# Patient Record
Sex: FEMALE | Race: BLACK OR AFRICAN AMERICAN | Employment: UNEMPLOYED | URBAN - METROPOLITAN AREA
[De-identification: names, ages, dates, MRNs, and addresses within clinical notes are randomized per-mention and may not be internally consistent; named-entity substitution may affect disease eponyms.]

---

## 2022-07-19 ENCOUNTER — HOSPITAL ENCOUNTER (EMERGENCY)
Age: 72
Discharge: HOME | End: 2022-07-19
Attending: STUDENT IN AN ORGANIZED HEALTH CARE EDUCATION/TRAINING PROGRAM
Payer: MEDICARE

## 2022-07-19 ENCOUNTER — APPOINTMENT (OUTPATIENT)
Dept: CT IMAGING | Age: 72
End: 2022-07-19
Attending: STUDENT IN AN ORGANIZED HEALTH CARE EDUCATION/TRAINING PROGRAM
Payer: MEDICARE

## 2022-07-19 VITALS
DIASTOLIC BLOOD PRESSURE: 93 MMHG | HEIGHT: 61 IN | SYSTOLIC BLOOD PRESSURE: 153 MMHG | TEMPERATURE: 98.5 F | HEART RATE: 70 BPM | RESPIRATION RATE: 18 BRPM | OXYGEN SATURATION: 97 %

## 2022-07-19 DIAGNOSIS — K52.9 COLITIS: Primary | ICD-10-CM

## 2022-07-19 LAB
ALBUMIN SERPL-MCNC: 4.4 G/DL (ref 3.5–5.2)
ALBUMIN/GLOB SERPL: 1.3 {RATIO} (ref 1.1–2.2)
ALP SERPL-CCNC: 82 U/L (ref 35–104)
ALT SERPL-CCNC: 15 U/L (ref 10–35)
ANION GAP SERPL CALC-SCNC: 12 MMOL/L (ref 5–15)
AST SERPL-CCNC: 32 U/L (ref 10–35)
BASOPHILS # BLD: 0 K/UL (ref 0–0.1)
BASOPHILS NFR BLD: 0 % (ref 0–1)
BILIRUB SERPL-MCNC: 0.6 MG/DL (ref 0.2–1)
BUN SERPL-MCNC: 16 MG/DL (ref 8–23)
BUN/CREAT SERPL: 16 (ref 12–20)
CALCIUM SERPL-MCNC: 9.9 MG/DL (ref 8.8–10.2)
CHLORIDE SERPL-SCNC: 103 MMOL/L (ref 98–107)
CO2 SERPL-SCNC: 27 MMOL/L (ref 22–29)
COMMENT, HOLDF: NORMAL
CREAT SERPL-MCNC: 1.02 MG/DL (ref 0.5–0.9)
DIFFERENTIAL METHOD BLD: ABNORMAL
EOSINOPHIL # BLD: 0 K/UL (ref 0–0.4)
EOSINOPHIL NFR BLD: 0 % (ref 0–7)
ERYTHROCYTE [DISTWIDTH] IN BLOOD BY AUTOMATED COUNT: 11.5 % (ref 11.5–14.5)
GLOBULIN SER CALC-MCNC: 3.3 G/DL (ref 2–4)
GLUCOSE SERPL-MCNC: 143 MG/DL (ref 65–100)
HCT VFR BLD AUTO: 38.1 % (ref 35–47)
HEMOCCULT STL QL: POSITIVE
HGB BLD-MCNC: 12.3 G/DL (ref 11.5–16)
IMM GRANULOCYTES # BLD AUTO: 0.1 K/UL (ref 0–0.04)
IMM GRANULOCYTES NFR BLD AUTO: 1 % (ref 0–0.5)
LYMPHOCYTES # BLD: 0.8 K/UL (ref 0.8–3.5)
LYMPHOCYTES NFR BLD: 4 % (ref 12–49)
MCH RBC QN AUTO: 30.1 PG (ref 26–34)
MCHC RBC AUTO-ENTMCNC: 32.3 G/DL (ref 30–36.5)
MCV RBC AUTO: 93.4 FL (ref 80–99)
MONOCYTES # BLD: 1.3 K/UL (ref 0–1)
MONOCYTES NFR BLD: 6 % (ref 5–13)
NEUTS SEG # BLD: 18.7 K/UL (ref 1.8–8)
NEUTS SEG NFR BLD: 89 % (ref 32–75)
NRBC # BLD: 0 K/UL (ref 0–0.01)
NRBC BLD-RTO: 0 PER 100 WBC
PLATELET # BLD AUTO: 322 K/UL (ref 150–400)
PMV BLD AUTO: 11.1 FL (ref 8.9–12.9)
POTASSIUM SERPL-SCNC: 4.1 MMOL/L (ref 3.5–5.1)
PROT SERPL-MCNC: 7.7 G/DL (ref 6.4–8.3)
RBC # BLD AUTO: 4.08 M/UL (ref 3.8–5.2)
SAMPLES BEING HELD,HOLD: NORMAL
SODIUM SERPL-SCNC: 142 MMOL/L (ref 136–145)
WBC # BLD AUTO: 20.9 K/UL (ref 3.6–11)

## 2022-07-19 PROCEDURE — 80053 COMPREHEN METABOLIC PANEL: CPT

## 2022-07-19 PROCEDURE — 74176 CT ABD & PELVIS W/O CONTRAST: CPT

## 2022-07-19 PROCEDURE — 99284 EMERGENCY DEPT VISIT MOD MDM: CPT

## 2022-07-19 PROCEDURE — 36415 COLL VENOUS BLD VENIPUNCTURE: CPT

## 2022-07-19 PROCEDURE — 85025 COMPLETE CBC W/AUTO DIFF WBC: CPT

## 2022-07-19 PROCEDURE — 82272 OCCULT BLD FECES 1-3 TESTS: CPT

## 2022-07-19 RX ORDER — SERTRALINE HYDROCHLORIDE 25 MG/1
TABLET, FILM COATED ORAL DAILY
COMMUNITY

## 2022-07-19 RX ORDER — PHENOL/SODIUM PHENOLATE
20 AEROSOL, SPRAY (ML) MUCOUS MEMBRANE DAILY
COMMUNITY

## 2022-07-19 RX ORDER — AMOXICILLIN AND CLAVULANATE POTASSIUM 875; 125 MG/1; MG/1
1 TABLET, FILM COATED ORAL 2 TIMES DAILY
Qty: 14 TABLET | Refills: 0 | Status: SHIPPED | OUTPATIENT
Start: 2022-07-19 | End: 2022-07-26

## 2022-07-19 RX ORDER — LANOLIN ALCOHOL/MO/W.PET/CERES
800 CREAM (GRAM) TOPICAL DAILY
COMMUNITY

## 2022-07-19 RX ORDER — ATORVASTATIN CALCIUM 10 MG/1
TABLET, FILM COATED ORAL DAILY
COMMUNITY

## 2022-07-19 RX ORDER — AMLODIPINE BESYLATE 10 MG/1
TABLET ORAL DAILY
COMMUNITY
End: 2022-08-29

## 2022-07-19 RX ORDER — DONEPEZIL HYDROCHLORIDE 10 MG/1
10 TABLET, FILM COATED ORAL
COMMUNITY

## 2022-07-19 RX ORDER — MEMANTINE HYDROCHLORIDE 10 MG/1
10 TABLET ORAL 2 TIMES DAILY
COMMUNITY

## 2022-07-19 RX ORDER — QUETIAPINE FUMARATE 25 MG/1
25 TABLET, FILM COATED ORAL
COMMUNITY

## 2022-07-19 NOTE — DISCHARGE INSTRUCTIONS
Patient presented to the ED with nausea and vomiting yesterday followed by blood in the stool today. Certainly a small mount of blood is present in stool now. CT scan shows colitis which is likely causing the bleeding. Hemoglobin is stable additionally patient's vital signs are stable. CT scan shows some bladder wall thickening with possible urinary tract infection however obtaining urine this patient would be difficult. Patient does not have history of UTIs. We will treat colitis with Augmentin for 7 days. Please follow-up with GI for further treatment and evaluation colonoscopy once symptoms have resolved. If bloody stool continues and patient becomes ill please follow-up more quickly or return to the ED for further evaluation.

## 2022-07-19 NOTE — ED TRIAGE NOTES
Pt ambulatory into ER accompanied by Daughter with cc of vomiting last night  and rectal bleeding noted in stool this morning. Pt has a baseline of dementia and alzheimers and is not oriented at baseline per daughter. Pt lives with daughter. Daughter describes rectal bleeding as bright red. Upon arrival, pt in no apparent distress. Pt singing and talking to self calmly. Pt's daughter denies seeing blood in vomit. Pt's daughter denies vomiting today.

## 2022-07-19 NOTE — ED PROVIDER NOTES
Patient is a 28-year-old female with a history of severe dementia presenting to the ED after having multiple signs of vomiting last night followed by bright red blood per rectum today. Patient has no abdominal pain nor has claimed any abdominal pain to family. Patient is acting at her baseline per family. Limited history available as patient's daughter just obtained custody. No further vomiting today. The history is provided by the patient and a relative. The history is limited by the condition of the patient. Rectal Bleeding   This is a new problem. The current episode started 3 to 5 hours ago. The stool is described as blood tinged. Associated symptoms include nausea, vomiting and diarrhea. Pertinent negatives include no abdominal pain, no abdominal distention, no back pain and no constipation. She has tried nothing for the symptoms. The treatment provided no relief. Her past medical history is significant for dementia. Past Medical History:   Diagnosis Date    Alzheimer's dementia ([facility] [address]     Dementia ([facility] [address]     Hypertension        History reviewed. No pertinent surgical history. History reviewed. No pertinent family history.     Social History     Socioeconomic History    Marital status:      Spouse name: Not on file    Number of children: Not on file    Years of education: Not on file    Highest education level: Not on file   Occupational History    Not on file   Tobacco Use    Smoking status: Never Smoker    Smokeless tobacco: Not on file   Substance and Sexual Activity    Alcohol use: Not Currently    Drug use: Never    Sexual activity: Not on file   Other Topics Concern    Not on file   Social History Narrative    Not on file     Social Determinants of Health     Financial Resource Strain:     Difficulty of Paying Living Expenses: Not on file   Food Insecurity:     Worried About Running Out of Food in the Last Year: Not on file    [address] in the Last Year: Not on file   Transportation Needs:     Lack of Transportation (Medical): Not on file    Lack of Transportation (Non-Medical): Not on file   Physical Activity:     Days of Exercise per Week: Not on file    Minutes of Exercise per Session: Not on file   Stress:     Feeling of Stress : Not on file   Social Connections:     Frequency of Communication with Friends and Family: Not on file    Frequency of Social Gatherings with Friends and Family: Not on file    Attends Yazdanism Services: Not on file    Active Member of [address] [place] [address] or Organizations: Not on file    Attends Club or Organization Meetings: Not on file    Marital Status: Not on file   Intimate Partner Violence:     Fear of Current or Ex-Partner: Not on file    Emotionally Abused: Not on file    Physically Abused: Not on file    Sexually Abused: Not on file   Housing Stability:     Unable to Pay for Housing in the Last Year: Not on file    Number of [place] in the Last Year: Not on file    Unstable Housing in the Last Year: Not on file         ALLERGIES: Patient has no known allergies. Review of Systems   Unable to perform ROS: Dementia   Gastrointestinal: Positive for anal bleeding, blood in stool, diarrhea, nausea and vomiting. Negative for abdominal distention, abdominal pain and constipation. Musculoskeletal: Negative for back pain. Vitals:    07/19/22 1318 07/19/22 1447 07/19/22 1559   BP: (!) 110/95 106/75 (!) 153/93   Pulse: 91 70    Resp: 18 18 18   Temp: 98.5 °F (36.9 °C)     SpO2: 100% 97%    Height: 5' 1\" (1.549 m)              Physical Exam  Vitals and nursing note reviewed. Constitutional:       General: She is not in acute distress. Appearance: Normal appearance. HENT:      Head: Normocephalic and atraumatic. Right Ear: External ear normal.      Left Ear: External ear normal.      Nose: Nose normal.   Eyes:      Extraocular Movements: Extraocular movements intact.       Conjunctiva/sclera: Conjunctivae normal.   Cardiovascular:      Rate and Rhythm: Normal rate. Pulses: Normal pulses. Radial pulses are 2+ on the right side and 2+ on the left side. Heart sounds: Normal heart sounds. Pulmonary:      Effort: Pulmonary effort is normal.      Breath sounds: Normal breath sounds. Chest:      Chest wall: No deformity or tenderness. Abdominal:      General: Abdomen is flat. There is no distension. Tenderness: There is no abdominal tenderness. Genitourinary:     Rectum: Guaiac result positive. Comments: Some small flecks of blood present in stool. No large-volume gross blood output  Musculoskeletal:         General: No deformity or signs of injury. Normal range of motion. Cervical back: Normal range of motion and neck supple. No tenderness. Skin:     General: Skin is warm and dry. Capillary Refill: Capillary refill takes less than 2 seconds. Neurological:      General: No focal deficit present. Mental Status: She is alert. Mental status is at baseline. Psychiatric:         Attention and Perception: Attention normal.      Comments: Patient with repetitive phrases, childlike behavior which is baseline per patient's daughter due to dementia. [facility]  ED Course as of 07/19/22 2219   Tue Jul 19, 2022   1457 HGB: 12.3 [AL]      ED Course User Index  [AL] Peri Alcantar MD     LABORATORY RESULTS:  Labs Reviewed   CBC WITH AUTOMATED DIFF - Abnormal; Notable for the following components:       Result Value    WBC 20.9 (*)     NEUTROPHILS 89 (*)     LYMPHOCYTES 4 (*)     IMMATURE GRANULOCYTES 1 (*)     ABS. NEUTROPHILS 18.7 (*)     ABS. MONOCYTES 1.3 (*)     ABS. IMM.  GRANS. 0.1 (*)     All other components within normal limits   METABOLIC PANEL, COMPREHENSIVE - Abnormal; Notable for the following components:    Glucose 143 (*)     Creatinine 1.02 (*)     GFR est non-AA 54 (*)     All other components within normal limits   OCCULT BLOOD, STOOL - Abnormal; Notable for the following components:    Occult blood, stool Positive (*)     All other components within normal limits   SAMPLES BEING HELD       IMAGING RESULTS:  CT ABD PELV WO CONT   Final Result   1. Probable mild colitis involving the splenic flexure of the colon. 2.  Probable bilateral adrenal adenomas. Evaluation is limited by motion. 3.  Multiple uterine leiomyoma. 4.  Urinary bladder wall thickening which may be part related to   underdistention. Cystitis is not excluded. Correlate with urinalysis          MEDICATIONS GIVEN:  Medications - No data to display    Differential diagnosis: Melena, internal hemorrhoid, GI bleed, GI illness with nausea vomiting and diarrhea    ED physician interpretation of laboratory results: Lab work without signs of symptomatic bleeding. Occult stool positive as well as generally grossly positive stool with flecks of blood. Patient's hemoglobin is stable. Within normal limits. Leukocytosis consistent with acute vomiting illness/colitis. MDM: Patient is a 21-year-old female with severe dementia presenting to the ED with nausea and vomiting last night and blood in stool this morning found to have likely colitis/acute GI illness with stable vital signs. Family not concerned of blood transfusion at this time and patient does not require it. Based on history and physical most likely patient has symptoms of nonspecific colitis. We will treat with Augmentin and instructed family to follow-up with GI. Strict return precautions given. Patient has no history of urinary tract infections. Discussed plan with patient's daughter and she is comfortable with discharge at this time. Key discharge instructions and summary of care: Patient presented to the ED with nausea and vomiting yesterday followed by blood in the stool today. Certainly a small mount of blood is present in stool now. CT scan shows colitis which is likely causing the bleeding.   Hemoglobin is stable additionally patient's vital signs are stable. CT scan shows some bladder wall thickening with possible urinary tract infection however obtaining urine this patient would be difficult. Patient does not have history of UTIs. We will treat colitis with Augmentin for 7 days. Please follow-up with GI for further treatment and evaluation colonoscopy once symptoms have resolved. If bloody stool continues and patient becomes ill please follow-up more quickly or return to the ED for further evaluation. The patient has been re-evaluated and feeling better. Patient is stable for discharge. All available radiology and laboratory results have been reviewed with patient and/or available family. Patient and/or family verbally conveyed their understanding and agreement of the patient's signs, symptoms, diagnosis, treatment and prognosis and additionally agree to follow-up as recommended in the discharge instructions or to return to the Emergency Department should their condition change or worsen prior to their follow-up appointment. All questions have been answered and patient and/or available family express understanding. IMPRESSION:  1. Colitis        DISPOSITION: Discharged     Celso Jimenez MD      Procedures

## 2022-07-19 NOTE — ED NOTES
Pt's daughter given discharge instructions, patient education, 1 prescription, and follow up information. Pt's Daughter verbalizes understanding. All questions answered. Pt discharged to home in private vehicle, ambulatory. Pt alert, RA, pain controlled.

## 2022-08-27 ENCOUNTER — APPOINTMENT (OUTPATIENT)
Dept: CT IMAGING | Age: 72
End: 2022-08-27
Attending: STUDENT IN AN ORGANIZED HEALTH CARE EDUCATION/TRAINING PROGRAM
Payer: MEDICARE

## 2022-08-27 ENCOUNTER — HOSPITAL ENCOUNTER (OUTPATIENT)
Age: 72
Setting detail: OBSERVATION
Discharge: HOME HEALTH | End: 2022-08-29
Attending: STUDENT IN AN ORGANIZED HEALTH CARE EDUCATION/TRAINING PROGRAM | Admitting: HOSPITALIST
Payer: MEDICARE

## 2022-08-27 DIAGNOSIS — R55 SYNCOPE AND COLLAPSE: Primary | ICD-10-CM

## 2022-08-27 DIAGNOSIS — J18.9 PNEUMONIA DUE TO INFECTIOUS ORGANISM, UNSPECIFIED LATERALITY, UNSPECIFIED PART OF LUNG: ICD-10-CM

## 2022-08-27 DIAGNOSIS — D64.9 ANEMIA, UNSPECIFIED TYPE: ICD-10-CM

## 2022-08-27 PROBLEM — R82.81 PYURIA: Status: ACTIVE | Noted: 2022-08-27

## 2022-08-27 PROBLEM — G30.9 ALZHEIMER'S DEMENTIA (HCC): Status: ACTIVE | Noted: 2022-08-27

## 2022-08-27 PROBLEM — E86.0 DEHYDRATION: Status: ACTIVE | Noted: 2022-08-27

## 2022-08-27 PROBLEM — F02.80 ALZHEIMER'S DEMENTIA (HCC): Status: ACTIVE | Noted: 2022-08-27

## 2022-08-27 PROBLEM — I10 HYPERTENSION: Status: ACTIVE | Noted: 2022-08-27

## 2022-08-27 PROBLEM — E78.5 HYPERLIPIDEMIA: Status: ACTIVE | Noted: 2022-08-27

## 2022-08-27 PROBLEM — Z79.899 POLYPHARMACY: Status: ACTIVE | Noted: 2022-08-27

## 2022-08-27 PROBLEM — I95.9 HYPOTENSION: Status: ACTIVE | Noted: 2022-08-27

## 2022-08-27 LAB
ALBUMIN SERPL-MCNC: 3.9 G/DL (ref 3.5–5.2)
ALBUMIN/GLOB SERPL: 1.3 {RATIO} (ref 1.1–2.2)
ALP SERPL-CCNC: 69 U/L (ref 35–104)
ALT SERPL-CCNC: 10 U/L (ref 10–35)
ANION GAP SERPL CALC-SCNC: 9 MMOL/L (ref 5–15)
APPEARANCE UR: ABNORMAL
AST SERPL-CCNC: 17 U/L (ref 10–35)
BACTERIA URNS QL MICRO: ABNORMAL /HPF
BASOPHILS # BLD: 0 K/UL (ref 0–0.1)
BASOPHILS NFR BLD: 0 % (ref 0–1)
BILIRUB SERPL-MCNC: 0.7 MG/DL (ref 0.2–1)
BILIRUB UR QL: NEGATIVE
BUN SERPL-MCNC: 16 MG/DL (ref 8–23)
BUN/CREAT SERPL: 12 (ref 12–20)
CALCIUM SERPL-MCNC: 10.1 MG/DL (ref 8.8–10.2)
CHLORIDE SERPL-SCNC: 103 MMOL/L (ref 98–107)
CO2 SERPL-SCNC: 32 MMOL/L (ref 22–29)
COLOR UR: ABNORMAL
COVID-19 RAPID TEST, COVR: NOT DETECTED
CREAT SERPL-MCNC: 1.34 MG/DL (ref 0.5–0.9)
DIFFERENTIAL METHOD BLD: ABNORMAL
EOSINOPHIL # BLD: 0.1 K/UL (ref 0–0.4)
EOSINOPHIL NFR BLD: 1 % (ref 0–7)
EPITH CASTS URNS QL MICRO: ABNORMAL /LPF
ERYTHROCYTE [DISTWIDTH] IN BLOOD BY AUTOMATED COUNT: 11.9 % (ref 11.5–14.5)
GLOBULIN SER CALC-MCNC: 3 G/DL (ref 2–4)
GLUCOSE SERPL-MCNC: 168 MG/DL (ref 65–100)
GLUCOSE UR STRIP.AUTO-MCNC: NEGATIVE MG/DL
HCT VFR BLD AUTO: 31.6 % (ref 35–47)
HGB BLD-MCNC: 10.2 G/DL (ref 11.5–16)
HGB UR QL STRIP: NEGATIVE
IMM GRANULOCYTES # BLD AUTO: 0 K/UL (ref 0–0.04)
IMM GRANULOCYTES NFR BLD AUTO: 0 % (ref 0–0.5)
KETONES UR QL STRIP.AUTO: NEGATIVE MG/DL
LDH SERPL L TO P-CCNC: 274 U/L (ref 81–246)
LEUKOCYTE ESTERASE UR QL STRIP.AUTO: ABNORMAL
LIPASE SERPL-CCNC: 22 U/L (ref 13–60)
LYMPHOCYTES # BLD: 1.5 K/UL (ref 0.8–3.5)
LYMPHOCYTES NFR BLD: 15 % (ref 12–49)
MCH RBC QN AUTO: 30.5 PG (ref 26–34)
MCHC RBC AUTO-ENTMCNC: 32.3 G/DL (ref 30–36.5)
MCV RBC AUTO: 94.6 FL (ref 80–99)
MONOCYTES # BLD: 0.8 K/UL (ref 0–1)
MONOCYTES NFR BLD: 8 % (ref 5–13)
NEUTS SEG # BLD: 7.9 K/UL (ref 1.8–8)
NEUTS SEG NFR BLD: 76 % (ref 32–75)
NITRITE UR QL STRIP.AUTO: NEGATIVE
NRBC # BLD: 0 K/UL (ref 0–0.01)
NRBC BLD-RTO: 0 PER 100 WBC
PH UR STRIP: 7.5 [PH] (ref 5–8)
PLATELET # BLD AUTO: 400 K/UL (ref 150–400)
PMV BLD AUTO: 10 FL (ref 8.9–12.9)
POTASSIUM SERPL-SCNC: 3.2 MMOL/L (ref 3.5–5.1)
PROCALCITONIN SERPL-MCNC: 0.08 NG/ML
PROT SERPL-MCNC: 6.9 G/DL (ref 6.4–8.3)
PROT UR STRIP-MCNC: NEGATIVE MG/DL
RBC # BLD AUTO: 3.34 M/UL (ref 3.8–5.2)
RBC #/AREA URNS HPF: ABNORMAL /HPF
RETICS # AUTO: 0.08 M/UL (ref 0.02–0.08)
RETICS/RBC NFR AUTO: 2.4 % (ref 0.7–2.1)
SODIUM SERPL-SCNC: 144 MMOL/L (ref 136–145)
SOURCE, COVRS: NORMAL
SP GR UR REFRACTOMETRY: 1.01 (ref 1–1.03)
TROPONIN I BLD-MCNC: <0.04 NG/ML (ref 0–0.08)
TROPONIN I BLD-MCNC: <0.04 NG/ML (ref 0–0.08)
TSH SERPL DL<=0.05 MIU/L-ACNC: 1.76 UIU/ML (ref 0.27–4.2)
UA: UC IF INDICATED,UAUC: ABNORMAL
UROBILINOGEN UR QL STRIP.AUTO: 0.2 EU/DL (ref 0.2–1)
WBC # BLD AUTO: 10.3 K/UL (ref 3.6–11)
WBC URNS QL MICRO: ABNORMAL /HPF (ref 0–4)

## 2022-08-27 PROCEDURE — 70450 CT HEAD/BRAIN W/O DYE: CPT

## 2022-08-27 PROCEDURE — 74011000250 HC RX REV CODE- 250: Performed by: STUDENT IN AN ORGANIZED HEALTH CARE EDUCATION/TRAINING PROGRAM

## 2022-08-27 PROCEDURE — 36415 COLL VENOUS BLD VENIPUNCTURE: CPT

## 2022-08-27 PROCEDURE — 74011250637 HC RX REV CODE- 250/637: Performed by: INTERNAL MEDICINE

## 2022-08-27 PROCEDURE — 72125 CT NECK SPINE W/O DYE: CPT

## 2022-08-27 PROCEDURE — 87635 SARS-COV-2 COVID-19 AMP PRB: CPT

## 2022-08-27 PROCEDURE — 84484 ASSAY OF TROPONIN QUANT: CPT

## 2022-08-27 PROCEDURE — 84145 PROCALCITONIN (PCT): CPT

## 2022-08-27 PROCEDURE — 74011250636 HC RX REV CODE- 250/636: Performed by: STUDENT IN AN ORGANIZED HEALTH CARE EDUCATION/TRAINING PROGRAM

## 2022-08-27 PROCEDURE — 81001 URINALYSIS AUTO W/SCOPE: CPT

## 2022-08-27 PROCEDURE — 74177 CT ABD & PELVIS W/CONTRAST: CPT

## 2022-08-27 PROCEDURE — 80053 COMPREHEN METABOLIC PANEL: CPT

## 2022-08-27 PROCEDURE — 99285 EMERGENCY DEPT VISIT HI MDM: CPT

## 2022-08-27 PROCEDURE — G0378 HOSPITAL OBSERVATION PER HR: HCPCS

## 2022-08-27 PROCEDURE — 74011250636 HC RX REV CODE- 250/636: Performed by: INTERNAL MEDICINE

## 2022-08-27 PROCEDURE — 87086 URINE CULTURE/COLONY COUNT: CPT

## 2022-08-27 PROCEDURE — 85045 AUTOMATED RETICULOCYTE COUNT: CPT

## 2022-08-27 PROCEDURE — 96366 THER/PROPH/DIAG IV INF ADDON: CPT

## 2022-08-27 PROCEDURE — 96365 THER/PROPH/DIAG IV INF INIT: CPT

## 2022-08-27 PROCEDURE — 84443 ASSAY THYROID STIM HORMONE: CPT

## 2022-08-27 PROCEDURE — 74011000636 HC RX REV CODE- 636: Performed by: STUDENT IN AN ORGANIZED HEALTH CARE EDUCATION/TRAINING PROGRAM

## 2022-08-27 PROCEDURE — 93005 ELECTROCARDIOGRAM TRACING: CPT

## 2022-08-27 PROCEDURE — 83690 ASSAY OF LIPASE: CPT

## 2022-08-27 PROCEDURE — 85025 COMPLETE CBC W/AUTO DIFF WBC: CPT

## 2022-08-27 PROCEDURE — 96375 TX/PRO/DX INJ NEW DRUG ADDON: CPT

## 2022-08-27 PROCEDURE — 83615 LACTATE (LD) (LDH) ENZYME: CPT

## 2022-08-27 PROCEDURE — 87077 CULTURE AEROBIC IDENTIFY: CPT

## 2022-08-27 RX ORDER — QUETIAPINE FUMARATE 25 MG/1
25 TABLET, FILM COATED ORAL
Status: DISCONTINUED | OUTPATIENT
Start: 2022-08-27 | End: 2022-08-29 | Stop reason: HOSPADM

## 2022-08-27 RX ORDER — SODIUM CHLORIDE 0.9 % (FLUSH) 0.9 %
5-40 SYRINGE (ML) INJECTION AS NEEDED
Status: DISCONTINUED | OUTPATIENT
Start: 2022-08-27 | End: 2022-08-29 | Stop reason: HOSPADM

## 2022-08-27 RX ORDER — ONDANSETRON 2 MG/ML
4 INJECTION INTRAMUSCULAR; INTRAVENOUS
Status: DISCONTINUED | OUTPATIENT
Start: 2022-08-27 | End: 2022-08-29 | Stop reason: HOSPADM

## 2022-08-27 RX ORDER — MEMANTINE HYDROCHLORIDE 10 MG/1
10 TABLET ORAL 2 TIMES DAILY
Status: DISCONTINUED | OUTPATIENT
Start: 2022-08-27 | End: 2022-08-29 | Stop reason: HOSPADM

## 2022-08-27 RX ORDER — SODIUM CHLORIDE, SODIUM LACTATE, POTASSIUM CHLORIDE, CALCIUM CHLORIDE 600; 310; 30; 20 MG/100ML; MG/100ML; MG/100ML; MG/100ML
100 INJECTION, SOLUTION INTRAVENOUS CONTINUOUS
Status: DISCONTINUED | OUTPATIENT
Start: 2022-08-27 | End: 2022-08-29 | Stop reason: HOSPADM

## 2022-08-27 RX ORDER — PANTOPRAZOLE SODIUM 40 MG/1
40 TABLET, DELAYED RELEASE ORAL
Status: DISCONTINUED | OUTPATIENT
Start: 2022-08-28 | End: 2022-08-29 | Stop reason: HOSPADM

## 2022-08-27 RX ORDER — SODIUM CHLORIDE 0.9 % (FLUSH) 0.9 %
5-40 SYRINGE (ML) INJECTION EVERY 8 HOURS
Status: DISCONTINUED | OUTPATIENT
Start: 2022-08-27 | End: 2022-08-29 | Stop reason: HOSPADM

## 2022-08-27 RX ORDER — DONEPEZIL HYDROCHLORIDE 5 MG/1
10 TABLET, FILM COATED ORAL
Status: DISCONTINUED | OUTPATIENT
Start: 2022-08-27 | End: 2022-08-29 | Stop reason: HOSPADM

## 2022-08-27 RX ORDER — NALOXONE HYDROCHLORIDE 0.4 MG/ML
0.4 INJECTION, SOLUTION INTRAMUSCULAR; INTRAVENOUS; SUBCUTANEOUS AS NEEDED
Status: DISCONTINUED | OUTPATIENT
Start: 2022-08-27 | End: 2022-08-28

## 2022-08-27 RX ORDER — HYDROCODONE BITARTRATE AND ACETAMINOPHEN 5; 325 MG/1; MG/1
1 TABLET ORAL
Status: DISCONTINUED | OUTPATIENT
Start: 2022-08-27 | End: 2022-08-28

## 2022-08-27 RX ORDER — SERTRALINE HYDROCHLORIDE 50 MG/1
25 TABLET, FILM COATED ORAL DAILY
Status: DISCONTINUED | OUTPATIENT
Start: 2022-08-28 | End: 2022-08-29 | Stop reason: HOSPADM

## 2022-08-27 RX ORDER — ACETAMINOPHEN 325 MG/1
650 TABLET ORAL
Status: DISCONTINUED | OUTPATIENT
Start: 2022-08-27 | End: 2022-08-29 | Stop reason: HOSPADM

## 2022-08-27 RX ADMIN — IOPAMIDOL 100 ML: 755 INJECTION, SOLUTION INTRAVENOUS at 14:54

## 2022-08-27 RX ADMIN — SODIUM CHLORIDE, POTASSIUM CHLORIDE, SODIUM LACTATE AND CALCIUM CHLORIDE 100 ML/HR: 600; 310; 30; 20 INJECTION, SOLUTION INTRAVENOUS at 16:45

## 2022-08-27 RX ADMIN — SODIUM CHLORIDE 1000 ML: 9 INJECTION, SOLUTION INTRAVENOUS at 14:53

## 2022-08-27 RX ADMIN — MEMANTINE HYDROCHLORIDE 10 MG: 10 TABLET ORAL at 22:30

## 2022-08-27 RX ADMIN — SODIUM CHLORIDE 1 G: 9 INJECTION INTRAMUSCULAR; INTRAVENOUS; SUBCUTANEOUS at 16:45

## 2022-08-27 RX ADMIN — QUETIAPINE FUMARATE 25 MG: 25 TABLET ORAL at 22:25

## 2022-08-27 RX ADMIN — AZITHROMYCIN MONOHYDRATE 500 MG: 500 INJECTION, POWDER, LYOPHILIZED, FOR SOLUTION INTRAVENOUS at 16:45

## 2022-08-27 RX ADMIN — DONEPEZIL HYDROCHLORIDE 10 MG: 5 TABLET, FILM COATED ORAL at 22:30

## 2022-08-27 NOTE — ED TRIAGE NOTES
Pt arrives via  from private residence with cc of witnessed syncopal episode with chin injury. Pt's daughter reports patient hit her chin on a granite counter top, skin intact. Pt has dementia and is nonverbal at baseline, follows commands, opens eyes spontaneously.

## 2022-08-27 NOTE — ED NOTES
SBAR given to Nick Austin RN. At this time, pt resting in position of comfort in locked and lowered bed. Patient talking to self in bed. Room within view of nurse's station. Side rails x 2. Pt continuously removing cardiac monitor leads and pulse ox.

## 2022-08-27 NOTE — ED NOTES
Urine occurrence in brief. Sheet wet. Laura care performed by this RN and Evangelina Flynn RN. Patient able to assist RNs with log roll. Wound noted on right lower glute when assessing patient from anterior view. Linen changed, bed pad changed. Prior to straight cath attempt, patient involuntarily urinating. Specimen caught in urine cup. Pure wick placed. Brief placed. Patient provided with 2 warm blankets. Patient's daughter remains at bedside. Side rails x 2. PIV Bolus continues to infuses.

## 2022-08-27 NOTE — ED NOTES
[facility] arranged by this RN for patient's admission to [facility], ETA 2200. Santos Vaughan RN and patient's daughter informed. Pt's daughter educated to inform this RN or Bhakti Arenas RN if she needs to leave the bedside due to patient's confused baseline. Patient continues to rest in locked and lowered bed with side rails x 2. Pt's daughter verbalizing understanding.

## 2022-08-27 NOTE — ED NOTES
Room within view of nurse's station. This RN continuously observing patient, door open. Patient resting in position of comfort in locked and lowered bed, side rails x 2. Lights dimmed.

## 2022-08-27 NOTE — ED PROVIDER NOTES
71-year-old female with a history of dementia presents to the ED for syncopal episode while at home. Patient is unable to contribute to HPI secondary to dementia. Daughter reports that she had a syncopal episode while walking in the kitchen. She fell to the floor. Reported patient hitting her chin. Did not lose consciousness, denies any anticoagulant use. Otherwise full HPI and review of systems are unable to be obtained secondary to patient's dementia and not answering questions      Syncope        Past Medical History:   Diagnosis Date    Alzheimer's dementia ([facility] [address]     Dementia ([facility] [address]     Hypertension        History reviewed. No pertinent surgical history. History reviewed. No pertinent family history. Social History     Socioeconomic History    Marital status:      Spouse name: Not on file    Number of children: Not on file    Years of education: Not on file    Highest education level: Not on file   Occupational History    Not on file   Tobacco Use    Smoking status: Never    Smokeless tobacco: Never   Substance and Sexual Activity    Alcohol use: Not Currently    Drug use: Never    Sexual activity: Not on file   Other Topics Concern    Not on file   Social History Narrative    Not on file     Social Determinants of Health     Financial Resource Strain: Not on file   Food Insecurity: Not on file   Transportation Needs: Not on file   Physical Activity: Not on file   Stress: Not on file   Social Connections: Not on file   Intimate Partner Violence: Not on file   Housing Stability: Not on file         ALLERGIES: Patient has no known allergies. Review of Systems   Reason unable to perform ROS: patient non-verbal.   Cardiovascular:  Positive for syncope. Vitals:    08/27/22 1316   BP: (!) 140/97   Pulse: 69   Resp: 16   Temp: 97.5 °F (36.4 °C)   SpO2: 97%   Weight: 57.6 kg (127 lb)   Height: 5' 2\" (1.575 m)            Physical Exam  Vitals and nursing note reviewed.    Constitutional: General: She is not in acute distress. Appearance: She is normal weight. She is not toxic-appearing. HENT:      Head: Normocephalic and atraumatic. Right Ear: Tympanic membrane, ear canal and external ear normal.      Left Ear: Tympanic membrane, ear canal and external ear normal.      Nose: Nose normal.      Mouth/Throat:      Mouth: Mucous membranes are moist.   Eyes:      Extraocular Movements: Extraocular movements intact. Conjunctiva/sclera: Conjunctivae normal.      Pupils: Pupils are equal, round, and reactive to light. Cardiovascular:      Rate and Rhythm: Normal rate and regular rhythm. Pulses: Normal pulses. Heart sounds: Normal heart sounds. Pulmonary:      Effort: Pulmonary effort is normal.      Breath sounds: Normal breath sounds. Abdominal:      General: Abdomen is flat. Bowel sounds are normal.      Palpations: Abdomen is soft. Tenderness: There is abdominal tenderness. Musculoskeletal:         General: No swelling or deformity. Cervical back: Normal range of motion. Lymphadenopathy:      Cervical: No cervical adenopathy. Skin:     General: Skin is warm. Capillary Refill: Capillary refill takes less than 2 seconds. Neurological:      Mental Status: She is alert. Comments: Alert but wont answer questions.  Moving all extremities   Psychiatric:      Comments: Non-verbal        MDM  Number of Diagnoses or Management Options  Anemia, unspecified type  Pneumonia due to infectious organism, unspecified laterality, unspecified part of lung  Syncope and collapse  Diagnosis management comments: Differential diagnosis includes not limited to stroke, electrolyte abnormality, cardiac dysrhythmia, ACS           Amount and/or Complexity of Data Reviewed  Clinical lab tests: reviewed  Tests in the radiology section of CPT®: reviewed  Tests in the medicine section of CPT®: reviewed      ED Course as of 09/01/22 1719   Sat Aug 27, 2022   1417 EKG performed at 1318 shows ventricular rate of 75, normal sinus rhythm, nonspecific ST abnormalities. [WG]   1803 CT head without IV contrast shows no evidence of acute infarct or intracranial hemorrhage. Periventricular white matter disease is likely secondary to chronic small vessel ischemic changes. CT cervical spine shows no evidence of acute fracture or subluxation CT abdomen pelvis shows multifocal groundglass opacities in the right lower lobe compatible with a low-grade pulmonary infection. Patient's ED evaluation included a CBC which shows no leukocytosis, some mild hypokalemia, slightly elevated serum creatinine level. Troponin not elevated. Patient requires further admission for syncope and pneumonia. [WG]      ED Course User Index  [WG] Dinorah Clancy, DO       Procedures    Perfect Serve Consult for Admission  3:39 PM    ED Room Number: S30/G37  Patient Name and age:  Olga Tejada 80 y.o.  female  Working Diagnosis:   1. Syncope and collapse    2. Pneumonia due to infectious organism, unspecified laterality, unspecified part of lung    3. Anemia, unspecified type        COVID-19 Suspicion:  no  Sepsis present:  no  Reassessment needed: no  Code Status:  Full Code  Readmission: no  Isolation Requirements:  no  Recommended Level of Care:  telemetry  Department:[facility] [facility]  Other: 81-year-old female with a history of dementia who had a syncopal episode at home, witnessed by daughters. Found to have pneumonia. Patient is not septic. Requires further admission evaluation for her syncope and pneumonia.

## 2022-08-28 ENCOUNTER — APPOINTMENT (OUTPATIENT)
Dept: NON INVASIVE DIAGNOSTICS | Age: 72
End: 2022-08-28
Attending: INTERNAL MEDICINE
Payer: MEDICARE

## 2022-08-28 LAB
ANION GAP SERPL CALC-SCNC: 7 MMOL/L (ref 5–15)
ATRIAL RATE: 75 BPM
BASOPHILS # BLD: 0 K/UL (ref 0–0.1)
BASOPHILS NFR BLD: 0 % (ref 0–1)
BUN SERPL-MCNC: 8 MG/DL (ref 6–20)
BUN/CREAT SERPL: 9 (ref 12–20)
CALCIUM SERPL-MCNC: 9.2 MG/DL (ref 8.5–10.1)
CALCULATED P AXIS, ECG09: 61 DEGREES
CALCULATED R AXIS, ECG10: 40 DEGREES
CALCULATED T AXIS, ECG11: 0 DEGREES
CHLORIDE SERPL-SCNC: 109 MMOL/L (ref 97–108)
CHOLEST SERPL-MCNC: 160 MG/DL
CO2 SERPL-SCNC: 29 MMOL/L (ref 21–32)
COMMENT, HOLDF: NORMAL
CREAT SERPL-MCNC: 0.92 MG/DL (ref 0.55–1.02)
DIAGNOSIS, 93000: NORMAL
DIFFERENTIAL METHOD BLD: ABNORMAL
ECHO AO ASC DIAM: 3.2 CM
ECHO AO ASCENDING AORTA INDEX: 2.03 CM/M2
ECHO AV AREA PEAK VELOCITY: 2.5 CM2
ECHO AV AREA VTI: 2.7 CM2
ECHO AV AREA/BSA PEAK VELOCITY: 1.6 CM2/M2
ECHO AV AREA/BSA VTI: 1.7 CM2/M2
ECHO AV MEAN GRADIENT: 4 MMHG
ECHO AV MEAN VELOCITY: 1 M/S
ECHO AV PEAK GRADIENT: 8 MMHG
ECHO AV PEAK VELOCITY: 1.5 M/S
ECHO AV VELOCITY RATIO: 0.73
ECHO AV VTI: 25.8 CM
ECHO LA DIAMETER INDEX: 2.15 CM/M2
ECHO LA DIAMETER: 3.4 CM
ECHO LA VOL 2C: 43 ML (ref 22–52)
ECHO LA VOL 4C: 32 ML (ref 22–52)
ECHO LA VOL BP: 37 ML (ref 22–52)
ECHO LA VOL/BSA BIPLANE: 23 ML/M2 (ref 16–34)
ECHO LA VOLUME AREA LENGTH: 41 ML
ECHO LA VOLUME INDEX A2C: 27 ML/M2 (ref 16–34)
ECHO LA VOLUME INDEX A4C: 20 ML/M2 (ref 16–34)
ECHO LA VOLUME INDEX AREA LENGTH: 26 ML/M2 (ref 16–34)
ECHO LV E' LATERAL VELOCITY: 7 CM/S
ECHO LV E' SEPTAL VELOCITY: 5 CM/S
ECHO LV FRACTIONAL SHORTENING: 31 % (ref 28–44)
ECHO LV INTERNAL DIMENSION DIASTOLE INDEX: 2.66 CM/M2
ECHO LV INTERNAL DIMENSION DIASTOLIC: 4.2 CM (ref 3.9–5.3)
ECHO LV INTERNAL DIMENSION SYSTOLIC INDEX: 1.84 CM/M2
ECHO LV INTERNAL DIMENSION SYSTOLIC: 2.9 CM
ECHO LV IVSD: 0.9 CM (ref 0.6–0.9)
ECHO LV MASS 2D: 118.7 G (ref 67–162)
ECHO LV MASS INDEX 2D: 75.1 G/M2 (ref 43–95)
ECHO LV POSTERIOR WALL DIASTOLIC: 0.9 CM (ref 0.6–0.9)
ECHO LV RELATIVE WALL THICKNESS RATIO: 0.43
ECHO LVOT AREA: 3.1 CM2
ECHO LVOT AV VTI INDEX: 0.86
ECHO LVOT DIAM: 2 CM
ECHO LVOT MEAN GRADIENT: 3 MMHG
ECHO LVOT PEAK GRADIENT: 5 MMHG
ECHO LVOT PEAK VELOCITY: 1.1 M/S
ECHO LVOT STROKE VOLUME INDEX: 43.9 ML/M2
ECHO LVOT SV: 69.4 ML
ECHO LVOT VTI: 22.1 CM
ECHO MV A VELOCITY: 0.9 M/S
ECHO MV E DECELERATION TIME (DT): 304.3 MS
ECHO MV E VELOCITY: 0.61 M/S
ECHO MV E/A RATIO: 0.68
ECHO MV E/E' LATERAL: 8.71
ECHO MV E/E' RATIO (AVERAGED): 10.46
ECHO MV E/E' SEPTAL: 12.2
ECHO PV MAX VELOCITY: 0.9 M/S
ECHO PV PEAK GRADIENT: 3 MMHG
ECHO RV INTERNAL DIMENSION: 3.4 CM
ECHO RV TAPSE: 2.2 CM (ref 1.7–?)
ECHO RVOT PEAK GRADIENT: 2 MMHG
ECHO RVOT PEAK VELOCITY: 0.7 M/S
ECHO TV REGURGITANT MAX VELOCITY: 2.37 M/S
ECHO TV REGURGITANT PEAK GRADIENT: 22 MMHG
EOSINOPHIL # BLD: 0.1 K/UL (ref 0–0.4)
EOSINOPHIL NFR BLD: 1 % (ref 0–7)
ERYTHROCYTE [DISTWIDTH] IN BLOOD BY AUTOMATED COUNT: 11.9 % (ref 11.5–14.5)
FERRITIN SERPL-MCNC: 778 NG/ML (ref 26–388)
FOLATE SERPL-MCNC: 54.1 NG/ML (ref 5–21)
GLUCOSE SERPL-MCNC: 106 MG/DL (ref 65–100)
HAPTOGLOB SERPL-MCNC: 47 MG/DL (ref 30–200)
HCT VFR BLD AUTO: 31.1 % (ref 35–47)
HDLC SERPL-MCNC: 79 MG/DL
HDLC SERPL: 2 {RATIO} (ref 0–5)
HGB BLD-MCNC: 10 G/DL (ref 11.5–16)
IMM GRANULOCYTES # BLD AUTO: 0 K/UL (ref 0–0.04)
IMM GRANULOCYTES NFR BLD AUTO: 0 % (ref 0–0.5)
IRON SATN MFR SERPL: 11 % (ref 20–50)
IRON SERPL-MCNC: 21 UG/DL (ref 35–150)
LDLC SERPL CALC-MCNC: 69.4 MG/DL (ref 0–100)
LYMPHOCYTES # BLD: 2.3 K/UL (ref 0.8–3.5)
LYMPHOCYTES NFR BLD: 19 % (ref 12–49)
MAGNESIUM SERPL-MCNC: 1.8 MG/DL (ref 1.6–2.4)
MCH RBC QN AUTO: 30.1 PG (ref 26–34)
MCHC RBC AUTO-ENTMCNC: 32.2 G/DL (ref 30–36.5)
MCV RBC AUTO: 93.7 FL (ref 80–99)
MONOCYTES # BLD: 1.1 K/UL (ref 0–1)
MONOCYTES NFR BLD: 9 % (ref 5–13)
NEUTS SEG # BLD: 8.7 K/UL (ref 1.8–8)
NEUTS SEG NFR BLD: 71 % (ref 32–75)
NRBC # BLD: 0 K/UL (ref 0–0.01)
NRBC BLD-RTO: 0 PER 100 WBC
P-R INTERVAL, ECG05: 130 MS
PLATELET # BLD AUTO: 404 K/UL (ref 150–400)
PMV BLD AUTO: 10.4 FL (ref 8.9–12.9)
POTASSIUM SERPL-SCNC: 3.3 MMOL/L (ref 3.5–5.1)
Q-T INTERVAL, ECG07: 432 MS
QRS DURATION, ECG06: 78 MS
QTC CALCULATION (BEZET), ECG08: 482 MS
RBC # BLD AUTO: 3.32 M/UL (ref 3.8–5.2)
SAMPLES BEING HELD,HOLD: NORMAL
SODIUM SERPL-SCNC: 145 MMOL/L (ref 136–145)
TIBC SERPL-MCNC: 189 UG/DL (ref 250–450)
TRIGL SERPL-MCNC: 58 MG/DL (ref ?–150)
VENTRICULAR RATE, ECG03: 75 BPM
VIT B12 SERPL-MCNC: 1555 PG/ML (ref 193–986)
VLDLC SERPL CALC-MCNC: 11.6 MG/DL
WBC # BLD AUTO: 12.2 K/UL (ref 3.6–11)

## 2022-08-28 PROCEDURE — 74011000250 HC RX REV CODE- 250: Performed by: INTERNAL MEDICINE

## 2022-08-28 PROCEDURE — 80048 BASIC METABOLIC PNL TOTAL CA: CPT

## 2022-08-28 PROCEDURE — 82746 ASSAY OF FOLIC ACID SERUM: CPT

## 2022-08-28 PROCEDURE — 36415 COLL VENOUS BLD VENIPUNCTURE: CPT

## 2022-08-28 PROCEDURE — G0378 HOSPITAL OBSERVATION PER HR: HCPCS

## 2022-08-28 PROCEDURE — 74011250637 HC RX REV CODE- 250/637: Performed by: INTERNAL MEDICINE

## 2022-08-28 PROCEDURE — 85025 COMPLETE CBC W/AUTO DIFF WBC: CPT

## 2022-08-28 PROCEDURE — 93306 TTE W/DOPPLER COMPLETE: CPT | Performed by: SPECIALIST

## 2022-08-28 PROCEDURE — 74011000250 HC RX REV CODE- 250: Performed by: HOSPITALIST

## 2022-08-28 PROCEDURE — 83540 ASSAY OF IRON: CPT

## 2022-08-28 PROCEDURE — 82607 VITAMIN B-12: CPT

## 2022-08-28 PROCEDURE — 96372 THER/PROPH/DIAG INJ SC/IM: CPT

## 2022-08-28 PROCEDURE — 83010 ASSAY OF HAPTOGLOBIN QUANT: CPT

## 2022-08-28 PROCEDURE — 36600 WITHDRAWAL OF ARTERIAL BLOOD: CPT

## 2022-08-28 PROCEDURE — 82728 ASSAY OF FERRITIN: CPT

## 2022-08-28 PROCEDURE — 93306 TTE W/DOPPLER COMPLETE: CPT

## 2022-08-28 PROCEDURE — 83735 ASSAY OF MAGNESIUM: CPT

## 2022-08-28 PROCEDURE — 84484 ASSAY OF TROPONIN QUANT: CPT

## 2022-08-28 PROCEDURE — 80061 LIPID PANEL: CPT

## 2022-08-28 PROCEDURE — 74011250636 HC RX REV CODE- 250/636: Performed by: HOSPITALIST

## 2022-08-28 RX ADMIN — MEMANTINE HYDROCHLORIDE 10 MG: 10 TABLET ORAL at 09:22

## 2022-08-28 RX ADMIN — PANTOPRAZOLE SODIUM 40 MG: 40 TABLET, DELAYED RELEASE ORAL at 09:22

## 2022-08-28 RX ADMIN — SODIUM CHLORIDE, PRESERVATIVE FREE 10 ML: 5 INJECTION INTRAVENOUS at 06:21

## 2022-08-28 RX ADMIN — OLANZAPINE 2.5 MG: 10 INJECTION, POWDER, FOR SOLUTION INTRAMUSCULAR at 20:13

## 2022-08-28 RX ADMIN — SERTRALINE 25 MG: 50 TABLET, FILM COATED ORAL at 09:22

## 2022-08-28 NOTE — ED NOTES
Donepezil 10mg and metantine 10mg given from daughter's supply. Pills crushed and placed in drink. Daughter states that patient will not take pills crushed in pudding or applesauce.

## 2022-08-28 NOTE — PROGRESS NOTES
Spiritual Care Assessment/Progress Note  [address]      NAME: Preethi January      MRN: [id number]  AGE: 80 y.o. SEX: female  Protestant Affiliation: No preference   Language: English     8/28/2022     Total Time (in minutes): 28     Spiritual Assessment begun in [facility] EMERGENCY DEPT through conversation with:         []Patient        [] Family    [] Friend(s)        Reason for Consult: Initial/Spiritual assessment, patient floor, Palliative Care, Initial/Spiritual Assessment     Spiritual beliefs: (Please include comment if needed)     [] Identifies with a claude tradition:         [] Supported by a claude community:            [] Claims no spiritual orientation:           [] Seeking spiritual identity:                [] Adheres to an individual form of spirituality:           [x] Not able to assess:                           Identified resources for coping:      [] Prayer                               [] Music                  [] Guided Imagery     [] Family/friends                 [] Pet visits     [] Devotional reading                         [x] Unknown     [] Other:                                               Interventions offered during this visit: (See comments for more details)                Plan of Care:     [] Support spiritual and/or cultural needs    [] Support AMD and/or advance care planning process      [] Support grieving process   [] Coordinate Rites and/or Rituals    [] Coordination with community clergy   [] No spiritual needs identified at this time   [] Detailed Plan of Care below (See Comments)  [] Make referral to Music Therapy  [] Make referral to Pet Therapy     [] Make referral to Addiction services  [] Make referral to [facility]  [] Make referral to Spiritual Care Partner  [] No future visits requested        [] Contact Spiritual Care for further referrals     Comments: Visit for Palliative spiritual assessment in ER 08. Ms Monalisa Hernandez appeared to be sleeping.  Also attempted phone call made to daughter Didi Craven with no avail. Provided ministry of presence, empathic listening, collaborated with staff. Please contact Spiritual Care for any further referrals. Visited by: Tina Wilder.  Rafael De La Rosa, [address] [place] [address] Road paging Service [phone])

## 2022-08-28 NOTE — H&P
[facility]  [address]  [phone]    Hospitalist Admission Note      NAME:  Rick Connors   :   1950   MRN:  [id number]     PCP:  Nader Hodges MD     Date/Time of service:  2022 8:38 PM          Subjective:     CHIEF COMPLAINT: syncope     HISTORY OF PRESENT ILLNESS:     Ms. Daija Aguilar is a 80 y.o.  female who presented to the Emergency Department complaining of syncope. She provides no hx due to severe dementia. Patient provides no interaction on interview due to severe dementia. Daughter provided hx to ER but is not longer present. Patient with unexplained syncope at home. ER finds no trauma to spine or head. They find dehydration. Records indicate polypharmacy. We will admit her for observation. Past Medical History:   Diagnosis Date    Alzheimer's dementia ([facility] [address]     Hyperlipidemia     Hypertension     Polypharmacy         History reviewed. No pertinent surgical history. Social History     Tobacco Use    Smoking status: Never    Smokeless tobacco: Never   Substance Use Topics    Alcohol use: Not Currently        History reviewed. No pertinent family history. Family hx cannot be fully assessed, since the patient cannot provide information    No Known Allergies     Prior to Admission medications    Medication Sig Start Date End Date Taking? Authorizing Provider   folic acid 780 mcg tablet Take 800 mcg by mouth daily. Fred Desouza MD   Omeprazole delayed release (PRILOSEC D/R) 20 mg tablet Take 20 mg by mouth daily. Fred Desouza MD   donepeziL (ARICEPT) 10 mg tablet Take 10 mg by mouth nightly. rFed Desouza MD   sertraline (ZOLOFT) 25 mg tablet Take  by mouth daily. Fred Desouza MD   QUEtiapine (SEROqueL) 25 mg tablet Take 25 mg by mouth. Fred Desouza MD   atorvastatin (LIPITOR) 10 mg tablet Take  by mouth daily. Fred Desouza MD   amLODIPine (NORVASC) 10 mg tablet Take  by mouth daily.     Fred Deosuza MD memantine (NAMENDA) 10 mg tablet Take 10 mg by mouth two (2) times a day. Fred Desouza MD   multivitamin, tx-iron-ca-min (THERA-M w/ IRON) 9 mg iron-400 mcg tab tablet Take 1 Tablet by mouth daily. Fred Desouza MD       Review of Systems:  (bold if positive, if negative)    Gen:  Eyes:  ENT:  CVS:  syncopePulm:  GI:  :  MS:  Skin:  Psych:  Endo:  Hem:  Renal:  Neuro:  weakness      Objective:      VITALS:    Vital signs reviewed; most recent are:    Visit Vitals  /80   Pulse 83   Temp 98.8 °F (37.1 °C)   Resp 15   Ht 5' 2\" (1.575 m)   Wt 57.6 kg (127 lb)   SpO2 98%   BMI 23.23 kg/m²     SpO2 Readings from Last 6 Encounters:   08/27/22 98%   07/19/22 97%          Intake/Output Summary (Last 24 hours) at 8/27/2022 2038  Last data filed at 8/27/2022 1827  Gross per 24 hour   Intake 1250 ml   Output --   Net 1250 ml        Exam:     Physical Exam:    Gen:  Frail, in no acute distress  HEENT:  Pink conjunctivae, PERRL, hearing intact to voice, moist mucous membranes  Neck:  Supple, without masses, thyroid non-tender  Resp:  No accessory muscle use, clear breath sounds without wheezes rales or rhonchi  Card:  No murmurs, normal S1, S2 without thrills, bruits or peripheral edema  Abd:  Soft, non-tender, non-distended, normoactive bowel sounds are present, no mass  Lymph:  No cervical or inguinal adenopathy  Musc:  No cyanosis or clubbing  Skin:  No rashes or ulcers, skin turgor is good  Neuro:  Cranial nerves are grossly intact, general motor weakness, does not follow commands appropriately  Psych:  Poor insight, not oriented     Labs:    Recent Labs     08/27/22  1328   WBC 10.3   HGB 10.2*   HCT 31.6*        Recent Labs     08/27/22  1328      K 3.2*      CO2 32*   *   BUN 16   CREA 1.34*   CA 10.1   ALB 3.9   TBILI 0.7   ALT 10     No results found for: GLUCPOC  No results for input(s): PH, PCO2, PO2, HCO3, FIO2 in the last 72 hours.   No results for input(s): INR, INREXT in the last 72 hours. All Micro Results       Procedure Component Value Units Date/Time    COVID-19 RAPID TEST [[id number]] Collected: 08/27/22 1616    Order Status: Completed Specimen: Nasopharyngeal Updated: 08/27/22 1721     Specimen source Nasopharyngeal        COVID-19 rapid test Not detected        Comment: Rapid Abbott ID Now       Rapid NAAT:  The specimen is NEGATIVE for SARS-CoV-2, the novel coronavirus associated with COVID-19. Negative results should be treated as presumptive and, if inconsistent with clinical signs and symptoms or necessary for patient management, should be tested with an alternative molecular assay. Negative results do not preclude SARS-CoV-2 infection and should not be used as the sole basis for patient management decisions. This test has been authorized by the FDA under an Emergency Use Authorization (EUA) for use by authorized laboratories. Fact sheet for Healthcare Providers: ConventionUpdate.co.nz  Fact sheet for Patients: ConventionUpdate.co.nz       Methodology: Isothermal Nucleic Acid Amplification         CULTURE, URINE [[id number]] Collected: 08/27/22 1550    Order Status: No result Updated: 08/27/22 1609            I have reviewed previous records       Assessment and Plan:      Syncope - POA, almost certainly due to dehydration and the effect of alzheimer and BP meds. Check ECHO. Monitor tele. Hydrate. Check orthostatics. Fall precautions. PT OT eval    Dehydration - POA due to poor PO intake, likely due to alzheimer's. Hydrate and follow. Alzheimer's dementia / Polypharmacy - POA, end stage. Continue donepezil, memantine, seroquel and sertraline. Palliative and hospice consults are warranted. Anemia - Recent mild lower GI bleed. Check serologies. I would not advocate further testing and treatment in setting of end stage Alzheimers. Pyuria / Abnormal chest xray - Procalcitonin low. No bacterial infection suspected. No Abx    Hypotension / Hypertension - BP low due to IVVD and use of BP meds. Hydrate. Stop amlodipine. Stop all testing and treatment in setting of end stage Alzheimers. Hyperlipidemia - Stop atorvastatin. Stop all testing and treatment in setting of end stage Alzheimers. GERD - PPI      Telemetry reviewed:   normal sinus rhythm    Risk of deterioration: high      Total time spent with patient: 48 Minutes I personally reviewed chart, notes, data and current medications in the medical record. I have personally examined and treated the patient at bedside during this period. To assist coordination of care and communication with nursing and staff, this note may be preliminary early in the day, but finalized by end of the day.                  Care Plan discussed with: Patient, Nursing Staff, and >50% of time spent in counseling and coordination of care    Discussed:  Care Plan and D/C Planning       ___________________________________________________    Attending Physician: Mitch Brown MD

## 2022-08-28 NOTE — ED NOTES
TRANSFER - IN REPORT:    Verbal report received from [facility]) on Anne Marie Conti  being received from [facility] ED (unit) for routine progression of care      Report consisted of patients Situation, Background, Assessment and   Recommendations(SBAR). Information from the following report(s) SBAR, Kardex, ED Summary, and MAR was reviewed with the receiving nurse. Opportunity for questions and clarification was provided.       Pending [facility] transport

## 2022-08-28 NOTE — ED NOTES
Orthostatics attempted with modifications. Laying flat pt 102/80 with heart rate 84  Sitting in bed 120/80 with heart rate 75        Pt unable to get out of bed with this RN. Pt unable to describe if she felt symptomatic during position change. Dr. Earle Quispe notified.

## 2022-08-28 NOTE — ED NOTES
Pt confused at baseline. Pt refuses to eat or drink with multiple attempts made at lunch and dinner.

## 2022-08-28 NOTE — ED TRIAGE NOTES
Pt arrives via EMS transferred from [facility] ED for hospital admission. Pt was in [facility] ED for syncopal episode with chin injury. No open wound noted at this time. Hx of dementia and nonverbal at baseline.

## 2022-08-28 NOTE — ED NOTES
Daughter at bedside. Patient refusing to leave monitoring equipment on, no replacement IV access attempt at this time.

## 2022-08-28 NOTE — ED NOTES
Per night shift RN pt had multiple IV and pt self removes IV due to dementia state. Pt at this time does not have iv access.  RT to arterial stick for needed blood work

## 2022-08-28 NOTE — PROGRESS NOTES
[facility]  [address], [place] [address]  [phone]       Hospitalist Progress Note      NAME: Stephen Scott   :  1950   MRN:  [id number]     Date/Time:  2022     Patient PCP: Maryanne Bearden MD    Emergency Contact:    Extended Emergency Contact Information  Primary Emergency Contact: Sserg Gonzalez  Mobile Phone: [phone]  Relation: Daughter      Code: Full Code     Isolation Precautions: There are currently no Active Isolations        Subjective:     REASON FOR VISIT:  Recheck syncope/fall    HPI & INTERVAL HISTORY:     She was seen and examined. Unable to obtain history from her given that she is very demented and cannot obtain a history or review of system. She appeared calm and in no distress. However, she kept pulling out her IV line at least 3 times. Discussed with daughter Taylor Ulloa at [phone] who confirmed that the patient had a syncopal episode at home which she witnessed was not able to get to the patient before she fell. After falling the patient had episodes of vomiting but has not vomited since admission.         No Known Allergies      ROS:  Unable to obtain history given her dementia         Objective:      Visit Vitals  /82 (BP 1 Location: Right upper arm, BP Patient Position: Sitting)   Pulse 80   Temp 98.7 °F (37.1 °C)   Resp 16   Ht 5' 2\" (1.575 m)   Wt 57.6 kg (127 lb)   SpO2 99%   BMI 23.23 kg/m²       Physical Exam:  General: Pleasantly confused in no acute distress  Head: Normocephalic, without obvious abnormality  Eyes: PERRL, anicteric sclerae, and conjuntiva clear  ENT: Mucosa moist  Neck: normal, supple, and no tenderness  Lungs: clear to auscultation with good breath sounds and normal respiratory effort  Heart: S1, S2 normal, regular rate, and regular rhythm  Abd: not distended, soft, nontender, BS present and normactive  Ext: no cyanosis and no edema  Skin: normal skin color, no rashes, and texture normal  Neuro: Oriented only to self unable to perform formal exam due to her limitations by dementia  Psych: Calm not anxious but unable to formally assess      Medications:  Current Facility-Administered Medications   Medication Dose Route Frequency    lactated Ringers infusion  100 mL/hr IntraVENous CONTINUOUS    sodium chloride (NS) flush 5-40 mL  5-40 mL IntraVENous Q8H    sodium chloride (NS) flush 5-40 mL  5-40 mL IntraVENous PRN    acetaminophen (TYLENOL) tablet 650 mg  650 mg Oral Q4H PRN    ondansetron (ZOFRAN) injection 4 mg  4 mg IntraVENous Q4H PRN    sertraline (ZOLOFT) tablet 25 mg  25 mg Oral DAILY    QUEtiapine (SEROquel) tablet 25 mg  25 mg Oral QHS    pantoprazole (PROTONIX) tablet 40 mg  40 mg Oral ACB    memantine (NAMENDA) tablet 10 mg  10 mg Oral BID    donepeziL (ARICEPT) tablet 10 mg  10 mg Oral QHS     Current Outpatient Medications   Medication Sig    folic acid 547 mcg tablet Take 800 mcg by mouth daily. Omeprazole delayed release (PRILOSEC D/R) 20 mg tablet Take 20 mg by mouth daily. donepeziL (ARICEPT) 10 mg tablet Take 10 mg by mouth nightly. sertraline (ZOLOFT) 25 mg tablet Take  by mouth daily. QUEtiapine (SEROqueL) 25 mg tablet Take 25 mg by mouth. atorvastatin (LIPITOR) 10 mg tablet Take  by mouth daily. amLODIPine (NORVASC) 10 mg tablet Take  by mouth daily. memantine (NAMENDA) 10 mg tablet Take 10 mg by mouth two (2) times a day. multivitamin, tx-iron-ca-min (THERA-M w/ IRON) 9 mg iron-400 mcg tab tablet Take 1 Tablet by mouth daily. Labs:  Recent Labs     08/27/22  1328   WBC 10.3   HGB 10.2*   HCT 31.6*        Recent Labs     08/27/22  1328      K 3.2*      CO2 32*   *   BUN 16   CREA 1.34*   CA 10.1   ALB 3.9   TBILI 0.7   ALT 10         Radiology:  CT HEAD WO CONT    Result Date: 8/27/2022  1. No evidence of acute infarct or intracranial hemorrhage.  2. Periventricular white matter disease is likely secondary to chronic small vessel ischemic changes. CT SPINE CERV WO CONT    Result Date: 8/27/2022  No evidence of fracture or subluxation. CT ABD PELV W CONT    Result Date: 8/27/2022  1. No acute abdominal or pelvic pathology. 2. Uterine fibroids. 3. New multifocal groundglass opacities in the right lower lobe, compatible with low-grade pulmonary infection. I personally reviewed and interpreted the imaging studies and agree with official reading    The labs, imaging studies, and medications was reviewed by me on: August 28, 2022         Assessment/Plan:       Syncope witnessed by daughter - POA  -Likely multifactorial to include poor oral intake with dehydration, along with her dementia, and medications  -Unable to formally assess orthostatic blood pressures but from supine to sitting up there were no changes  -Echo pending  -PT and OT pending  -Fall precautions    Dehydration - POA   Poor oral intake  Does not allow IV lines will therefore need to hydrate orally    Alzheimer's dementia / Polypharmacy - POA, end stage. Continue donepezil, memantine, seroquel and sertraline. Palliative and hospice consults are warranted. Anemia - Recent mild lower GI bleed. Appears to be of chronic disease  Agree with no further testing given her end-stage Alzheimer's     Pyuria / Abnormal chest xray - Procalcitonin low. No bacterial infection suspected. No Abx     Hypotension / Hypertension - BP low due to IVVD and use of BP meds.    Hold BP meds  Hydrate as mentioned above     Hyperlipidemia   Stopped on atorvastatin     GERD   PPI    Body mass index is 23.23 kg/m².:  18.5 - 24.9:  Normal weight        Discussed:  Pt's condition, Imaging findings, Lab findings, Assessment, and Care Plan discussed with: Patient, Family via phone, RN, and Care Manager    Prophylaxis:  SCD's    Probable disposition:  Home with family      Total time: -25- minutes **I personally saw and examined the patient during this time period**      Date of service:    8/28/2022                ___________________________________________________    Admitting Physician: Delia Caruso MD

## 2022-08-29 VITALS
HEIGHT: 62 IN | DIASTOLIC BLOOD PRESSURE: 73 MMHG | WEIGHT: 127 LBS | TEMPERATURE: 98.1 F | RESPIRATION RATE: 16 BRPM | HEART RATE: 70 BPM | BODY MASS INDEX: 23.37 KG/M2 | OXYGEN SATURATION: 100 % | SYSTOLIC BLOOD PRESSURE: 109 MMHG

## 2022-08-29 LAB
BACTERIA SPEC CULT: ABNORMAL
CC UR VC: ABNORMAL
SERVICE CMNT-IMP: ABNORMAL

## 2022-08-29 PROCEDURE — 96376 TX/PRO/DX INJ SAME DRUG ADON: CPT

## 2022-08-29 PROCEDURE — 74011000250 HC RX REV CODE- 250: Performed by: HOSPITALIST

## 2022-08-29 PROCEDURE — 65270000046 HC RM TELEMETRY

## 2022-08-29 PROCEDURE — 74011250636 HC RX REV CODE- 250/636: Performed by: HOSPITALIST

## 2022-08-29 PROCEDURE — G0378 HOSPITAL OBSERVATION PER HR: HCPCS

## 2022-08-29 RX ORDER — POTASSIUM CHLORIDE 750 MG/1
40 TABLET, FILM COATED, EXTENDED RELEASE ORAL
Status: DISPENSED | OUTPATIENT
Start: 2022-08-29 | End: 2022-08-29

## 2022-08-29 RX ORDER — CEFDINIR 300 MG/1
300 CAPSULE ORAL 2 TIMES DAILY
Qty: 6 CAPSULE | Refills: 0 | Status: SHIPPED | OUTPATIENT
Start: 2022-08-29 | End: 2022-09-01

## 2022-08-29 RX ADMIN — SODIUM CHLORIDE 1 G: 9 INJECTION INTRAMUSCULAR; INTRAVENOUS; SUBCUTANEOUS at 10:09

## 2022-08-29 NOTE — PROGRESS NOTES
[facility]  [address]  [phone]       Hospitalist Progress Note      NAME: Meme Lazo   :  1950   MRN:  [id number]     Date/Time:  2022     Patient PCP: Gardenia Morris MD    Emergency Contact:    Extended Emergency Contact Information  Primary Emergency Contact: Mya Gonzalez  Mobile Phone: [phone]  Relation: Daughter      Code: Full Code     Isolation Precautions: There are currently no Active Isolations        Subjective:     REASON FOR VISIT:  Recheck syncope/fall    HPI & INTERVAL HISTORY:       : Patient was seen and examined. Again unable to obtain any history or review of system from patient given that she has profound dementia. No acute events overnight.    : She was seen and examined. Unable to obtain history from her given that she is very demented and cannot obtain a history or review of system. She appeared calm and in no distress. However, she kept pulling out her IV line at least 3 times. Discussed with daughter Deanna Briseno at [phone] who confirmed that the patient had a syncopal episode at home which she witnessed was not able to get to the patient before she fell. After falling the patient had episodes of vomiting but has not vomited since admission.         No Known Allergies      ROS:  Unable to obtain history given her dementia         Objective:      Visit Vitals  /64 (BP 1 Location: Right upper arm)   Pulse 68   Temp 98.1 °F (36.7 °C)   Resp 18   Ht 5' 2\" (1.575 m)   Wt 57.6 kg (127 lb)   SpO2 100%   BMI 23.23 kg/m²       Physical Exam:  General: No acute distress  Head: Normocephalic, without obvious abnormality  Eyes: anicteric sclerae and conjuntiva clear  Neck: normal, supple, and no tenderness  Lungs: clear to auscultation  Heart: S1, S2 normal, regular rate, and regular rhythm  Abd: not distended, soft, nontender, BS present and normactive  Ext: no cyanosis and no edema  Skin: normal skin color, no rashes, and texture normal  Neuro: Oriented only to self unable to perform formal exam due to her limitations by dementia  Psych: Calm not anxious but unable to formally assess      Medications:  Current Facility-Administered Medications   Medication Dose Route Frequency    potassium chloride SR (KLOR-CON 10) tablet 40 mEq  40 mEq Oral Q2H    lactated Ringers infusion  100 mL/hr IntraVENous CONTINUOUS    sodium chloride (NS) flush 5-40 mL  5-40 mL IntraVENous Q8H    sodium chloride (NS) flush 5-40 mL  5-40 mL IntraVENous PRN    acetaminophen (TYLENOL) tablet 650 mg  650 mg Oral Q4H PRN    ondansetron (ZOFRAN) injection 4 mg  4 mg IntraVENous Q4H PRN    sertraline (ZOLOFT) tablet 25 mg  25 mg Oral DAILY    QUEtiapine (SEROquel) tablet 25 mg  25 mg Oral QHS    pantoprazole (PROTONIX) tablet 40 mg  40 mg Oral ACB    memantine (NAMENDA) tablet 10 mg  10 mg Oral BID    donepeziL (ARICEPT) tablet 10 mg  10 mg Oral QHS     Current Outpatient Medications   Medication Sig    folic acid 566 mcg tablet Take 800 mcg by mouth daily. Omeprazole delayed release (PRILOSEC D/R) 20 mg tablet Take 20 mg by mouth daily. donepeziL (ARICEPT) 10 mg tablet Take 10 mg by mouth nightly. sertraline (ZOLOFT) 25 mg tablet Take  by mouth daily. QUEtiapine (SEROqueL) 25 mg tablet Take 25 mg by mouth. atorvastatin (LIPITOR) 10 mg tablet Take  by mouth daily. amLODIPine (NORVASC) 10 mg tablet Take  by mouth daily. memantine (NAMENDA) 10 mg tablet Take 10 mg by mouth two (2) times a day. multivitamin, tx-iron-ca-min (THERA-M w/ IRON) 9 mg iron-400 mcg tab tablet Take 1 Tablet by mouth daily.         Labs:  Recent Labs     08/28/22  0831   WBC 12.2*   HGB 10.0*   HCT 31.1*   *       Recent Labs     08/28/22  0831 08/27/22  1328    144   K 3.3* 3.2*   * 103   CO2 29 32*   * 168*   BUN 8 16   CREA 0.92 1.34*   CA 9.2 10.1   MG 1.8  --    ALB  --  3.9   TBILI  --  0.7   ALT  --  10 Radiology:  No results found. I personally reviewed and interpreted the imaging studies and agree with official reading    The labs, imaging studies, and medications was reviewed by me on: August 29, 2022         Assessment/Plan:       Syncope witnessed by daughter - POA  -Likely multifactorial to include poor oral intake with dehydration, along with her dementia, and medications  -Unable to formally assess orthostatic blood pressures but from supine to sitting up there were no changes  -Echo pending  -PT and OT pending  -Fall precautions    Possible UTI with pyuria / Leukocytosis / Abnormal chest xray - Procalcitonin low. Will repeat CBC and UA. Will err on the side of caution given that the pt is noncommunicative and start Rocephin IV    Dehydration - POA   Encourage oral intake. Will attempt IV fluids again but she continues to pull out IVs.    Alzheimer's dementia / Polypharmacy - POA, end stage. Continue donepezil, memantine, seroquel and sertraline. Palliative and hospice consults are warranted. Anemia - Recent mild lower GI bleed. Appears to be of chronic disease  Agree with no further testing given her end-stage Alzheimer's     Hypotension / Hypertension - BP low due to IVVD and use of BP meds.    Hold BP meds  Hydrate as mentioned above     Hyperlipidemia   Stopped on atorvastatin     GERD   PPI    Body mass index is 23.23 kg/m².:  18.5 - 24.9:  Normal weight        Discussed:  Pt's condition, Imaging findings, Lab findings, Assessment, and Care Plan discussed with: Patient, Family via phone, RN, and Care Manager    Prophylaxis:  SCD's    Probable disposition:  Home with family      Total time: 30 minutes **I personally saw and examined the patient during this time period**      Date of service:    8/29/2022         Patient was upgraded from observation status to inpatient given her syncopal episode hypotension and possible UTI and will be requiring over 2 midnight of work-up and care           ___________________________________________________    Admitting Physician: Dixie Cruz MD

## 2022-08-29 NOTE — PROGRESS NOTES
Physical Therapy    Consult received, chart reviewed. Spoke with RN who notes patient has been paranoid and refusing treatments but okay to attempt. Received in sidelying with patient in no distress. Began moaning and grunting as PT introduced herself, would not respond to any questions, occasionally shrugged her shoulders in response. Patient not effectively participating in evaluation at this time, either due to AMS or volitional desire to decline. Will follow back as appropriate.     Primitivo Coffey, MS, PT

## 2022-08-29 NOTE — PROGRESS NOTES
8/29/2022  11:59 AM  CM provided verbal explanation of 1st Medicare IM letter to pt's daughter as pt s unable to sign d/t dementia. CM left letter at bedside, copy to pt's bedside chart.   TITO Crowder

## 2022-08-29 NOTE — PROGRESS NOTES
8/29/2022  11:11 AM  Medicare Outpatient Observation Notice (MOON)/ [place] Outpatient Observation Notice (Kevin Caban) provided to patient/representative with verbal explanation of the notice. Time allotted for questions regarding the notice. Patient /representative provided a completed copy of the MOON/VOON notice. Copy placed on bedside chart.   TITO Magaña

## 2022-08-29 NOTE — PROGRESS NOTES
Occupational therapy note:  Orders received, chart reviewed. Patient received in bed in left sidelying. She opens eyes to therapist voice and lifts head from pillow with turning on light in room. Patient with some grunting and verbalizations during interaction, but no command following or responses to attempts for mobility. Patient currently not appropriate for therapy services. Will complete current orders. Please re consult if need arises. Tasha White MS OTR/L

## 2022-08-29 NOTE — ED NOTES
Verbal shift change report given to Felice Curry (oncoming nurse) by Luke Diaz (offgoing nurse). Report included the following information SBAR, ED Summary, Procedure Summary and MAR.

## 2022-08-29 NOTE — DISCHARGE INSTRUCTIONS
HOSPITALIST DISCHARGE INSTRUCTIONS  NAME: Florida Monroe   :  1950   MRN:  [id number]     Date/Time:  2022 6:36 PM    ADMIT DATE: 2022     DISCHARGE DATE: 2022     ADMITTING DIAGNOSIS:  Syncope, fall, dehydration, UTI    DISCHARGE DIAGNOSIS:  You were admitted for evaluation and treatment of the above. You received IV fluids and antibiotics. You will need to complete several more days of antibiotics (cefdinir). I have also stopped your blood pressure medicine (amlodipine) as it may have contributed to your fall (blood pressure too low). Please resume this medication only if your blood pressure is consistently > 150/90    MEDICATIONS:    It is important that you take the medication exactly as they are prescribed. Keep your medication in the bottles provided by the pharmacist and keep a list of the medication names, dosages, and times to be taken in your wallet. Do not take other medications without consulting your doctor. If you experience any of the following symptoms then please call your primary care physician or return to the emergency room if you cannot get hold of your doctor:  Fever, chills, nausea, vomiting, diarrhea, change in mentation, falling, bleeding, shortness of breath    Follow Up:  Please call and set up an appointment to see them in 1 week. [facility]  [phone]  Follow up  In Home Urgent Care,, As needed    [address] [address]  [phone]    Go on 2022  Hospital Follow Up at 2:00pm. They have also placed on there Wait List so if a Sooner appointment becomes available they will contact you. Fred Desouza MD  Patient can only remember the practice name and not the physician              Information obtained by :  I understand that if any problems occur once I am at home I am to contact my physician. I understand and acknowledge receipt of the instructions indicated above. Physician's or R.N.'s Signature                                                                  Date/Time                                                                                                                                              Patient or Representative Signature                                                          Date/Time

## 2022-08-29 NOTE — PROGRESS NOTES
Hospital Follow Up New Patient Appointment with PCP  Kristen FRANCIS [address] [facility]. Scheduled for 11/8/2022 at 2:00pm.  This was the Earliest appointment available also scheduling placed patient on Wait List so if there is a Sooner appointment available they will notify her.

## 2022-08-29 NOTE — DISCHARGE SUMMARY
Physician Discharge Summary     Patient ID:  Meme Lazo  [id number]  89 y.o.  1950    Admit date: 8/27/2022    Discharge date and time: 8/29/2022    Admission Diagnoses: Syncope [R55]    Discharge Diagnoses:    Principal Problem:    Syncope (8/27/2022)    Active Problems:    Alzheimer's dementia ([address] (8/27/2022)      Hypertension (8/27/2022)      Dehydration (8/27/2022)      Hypotension (8/27/2022)      Hyperlipidemia (8/27/2022)      Anemia (8/27/2022)      Pyuria (8/27/2022)      Polypharmacy (8/27/2022)           Hospital Course:   Ms. Asha Maravilla is a 80 y.o.  female who presented to the Emergency Department complaining of syncope. She provides no hx due to severe dementia. Patient resides with family. Daughter witnessed episode and couldn't make it to her in time. She was admitted for further evaluation and treatment of the following:      Syncope witnessed by daughter - [place]  -Likely multifactorial to include poor oral intake with dehydration, along with her dementia, and medications. She was hydrated and her amlodipine discontinued. She was treated for possible UTI as below. She is ambulating at her baseline per family, and family desires to take her home without further evaluation     Possible UTI with pyuria / Leukocytosis: Ucx with more typical contaminant organism, but given her sequelae and respone to abx will treat. Dehydration - POA . Received some IVF, but pulled IV. Continue PO intake as able. Alzheimer's dementia / Polypharmacy - POA, end stage. Continue donepezil, memantine, seroquel and sertraline. Palliative and hospice consults are warranted, but family wanted to take patient home     Anemia - Recent mild lower GI bleed. Appears to be of chronic disease  Agree with no further testing given her end-stage Alzheimer's     Hypotension / Hypertension - BP low due to IVVD and use of BP meds.    Stop amlodipine     Hyperlipidemia   Stopped on atorvastatin     GERD PPI    Condition of patient at discharge: fair    Discharge Exam:    Physical Exam:    See progress note from today    Disposition: home    Patient Instructions:   Current Discharge Medication List        START taking these medications    Details   cefdinir (OMNICEF) 300 mg capsule Take 1 Capsule by mouth two (2) times a day for 3 days. Qty: 6 Capsule, Refills: 0           CONTINUE these medications which have NOT CHANGED    Details   folic acid 700 mcg tablet Take 800 mcg by mouth daily. Omeprazole delayed release (PRILOSEC D/R) 20 mg tablet Take 20 mg by mouth daily. donepeziL (ARICEPT) 10 mg tablet Take 10 mg by mouth nightly. sertraline (ZOLOFT) 25 mg tablet Take  by mouth daily. QUEtiapine (SEROqueL) 25 mg tablet Take 25 mg by mouth. atorvastatin (LIPITOR) 10 mg tablet Take  by mouth daily. memantine (NAMENDA) 10 mg tablet Take 10 mg by mouth two (2) times a day. multivitamin, tx-iron-ca-min (THERA-M w/ IRON) 9 mg iron-400 mcg tab tablet Take 1 Tablet by mouth daily. STOP taking these medications       amLODIPine (NORVASC) 10 mg tablet Comments:   Reason for Stopping:             Activity: Activity as tolerated  Diet: Regular Diet  Wound Care: None needed    Total time spent in coordination of discharge: 35 min      Signed:   Terry Taylor MD  8/29/2022  6:37 PM

## 2022-08-29 NOTE — PROGRESS NOTES
8/29/2022  11:53 AM  LARISSA e-mailed CMS to check if [facility] can accept new pt. TITO Pérez       11:36 AM  CM completed assessment w/ pt's daughter Victorino Boyd via phone as pt has dementia and is unable to provide history. Charted demographics verified, pt lives w/ daughter Enrike Arnold since relocating from [place] in June of this year. Home is a 2 story w/ 4 entry steps ans 13 interior steps to her bed/bath. Once in the house the pt stays on the 2nd floor. At baseline pt is ambulatory, she can perform ADLs w/ supervision and her daughter manages her medications, she also work from home and pt has 24/7 supervision. Pt recently fell at home hitting her chin on the kitchen counter. Per daughter pt is not  and her  lives in [facility], she has not completed a AMD priro to her dementia    Reason for Admission:  Emergent for Syncope  Pt is a 79 yo AAF who presents from home following fall.   PMHx:   Alzheimer's dementia ([facility] [address]       Hyperlipidemia      Hypertension      Polypharmacy                       RUR Score:     Not yet assigned, pt was admitted under OBS                 Plan for utilizing home health:  PT, OT evals pending     pt has no history of HH or Rehab  DME: None  Rx: [employer], pt uses [facility] [address] in [place] and is normally covered for her medications  COVID Vax Hx: pt has 2 jabs and 2 boosters, most recently 8/3/22  PCP: First and Last name:  Other, MD Fred     Name of Practice:    Are you a current patient: Yes/No:    Approximate date of last visit:    Can you participate in a virtual visit with your PCP:                     Current Advanced Directive/Advance Care Plan: Full Code  [facility] daughter Victorino Boyd (C) [phone]    Healthcare Decision Maker:   Click here to complete [address] including selection of the Healthcare Decision Maker Relationship (ie \"Primary\")             Primary Decision Maker: Dayan Diaz - Daughter - [phone]                  Transition of Care Plan:                    RUR Not yet [facility] admission for medical management   Palliative consult  PT, OT evals pending  CM to follow through for treatment/response  Est pt w/ PCP  DC when stable to home w/ family assistance 24/7 and [facility] vs None  Outpatient follow up PCP, specialists  [facility] resources   Lew Pace will transport at [address] [facility] Management Interventions  PCP Verified by CM: No (pt has no current PCP)  Palliative Care Criteria Met (RRAT>21 & CHF Dx)?: Yes  Palliative Consult Recommended?: Yes  Mode of Transport at Discharge: Self (Family)  Physical Therapy Consult: Yes  Occupational Therapy Consult: Yes  Confirm Follow Up Transport: Family  Discharge Location  Patient Expects to be Discharged to[de-identified] Home with home health  TITO Romo

## 2022-09-26 PROBLEM — E86.0 DEHYDRATION: Status: RESOLVED | Noted: 2022-08-27 | Resolved: 2022-09-26

## 2022-11-08 ENCOUNTER — OFFICE VISIT (OUTPATIENT)
Dept: PRIMARY CARE CLINIC | Age: 72
End: 2022-11-08
Payer: MEDICARE

## 2022-11-08 VITALS
WEIGHT: 123 LBS | HEART RATE: 69 BPM | OXYGEN SATURATION: 99 % | RESPIRATION RATE: 16 BRPM | BODY MASS INDEX: 22.63 KG/M2 | HEIGHT: 62 IN

## 2022-11-08 DIAGNOSIS — R32 BOWEL AND BLADDER INCONTINENCE: ICD-10-CM

## 2022-11-08 DIAGNOSIS — E78.5 HYPERLIPIDEMIA, UNSPECIFIED HYPERLIPIDEMIA TYPE: ICD-10-CM

## 2022-11-08 DIAGNOSIS — D64.9 ANEMIA, UNSPECIFIED TYPE: ICD-10-CM

## 2022-11-08 DIAGNOSIS — J30.89 ENVIRONMENTAL AND SEASONAL ALLERGIES: ICD-10-CM

## 2022-11-08 DIAGNOSIS — K21.9 GASTROESOPHAGEAL REFLUX DISEASE, UNSPECIFIED WHETHER ESOPHAGITIS PRESENT: ICD-10-CM

## 2022-11-08 DIAGNOSIS — Z76.89 ENCOUNTER TO ESTABLISH CARE: Primary | ICD-10-CM

## 2022-11-08 DIAGNOSIS — F02.80 ALZHEIMER'S DEMENTIA, UNSPECIFIED DEMENTIA SEVERITY, UNSPECIFIED TIMING OF DEMENTIA ONSET, UNSPECIFIED WHETHER BEHAVIORAL, PSYCHOTIC, OR MOOD DISTURBANCE OR ANXIETY (HCC): ICD-10-CM

## 2022-11-08 DIAGNOSIS — R15.9 BOWEL AND BLADDER INCONTINENCE: ICD-10-CM

## 2022-11-08 DIAGNOSIS — I10 ESSENTIAL HYPERTENSION: ICD-10-CM

## 2022-11-08 DIAGNOSIS — K59.09 CHRONIC CONSTIPATION: ICD-10-CM

## 2022-11-08 DIAGNOSIS — G30.9 ALZHEIMER'S DEMENTIA, UNSPECIFIED DEMENTIA SEVERITY, UNSPECIFIED TIMING OF DEMENTIA ONSET, UNSPECIFIED WHETHER BEHAVIORAL, PSYCHOTIC, OR MOOD DISTURBANCE OR ANXIETY (HCC): ICD-10-CM

## 2022-11-08 PROCEDURE — 99204 OFFICE O/P NEW MOD 45 MIN: CPT | Performed by: FAMILY MEDICINE

## 2022-11-08 PROCEDURE — 1123F ACP DISCUSS/DSCN MKR DOCD: CPT | Performed by: FAMILY MEDICINE

## 2022-11-08 RX ORDER — QUETIAPINE FUMARATE 50 MG/1
25 TABLET, FILM COATED ORAL
Qty: 30 TABLET | Refills: 3 | Status: SHIPPED | OUTPATIENT
Start: 2022-11-08 | End: 2022-11-08 | Stop reason: SDUPTHER

## 2022-11-08 RX ORDER — PHENOL/SODIUM PHENOLATE
20 AEROSOL, SPRAY (ML) MUCOUS MEMBRANE DAILY
Qty: 30 TABLET | Refills: 3 | Status: SHIPPED | OUTPATIENT
Start: 2022-11-08

## 2022-11-08 RX ORDER — QUETIAPINE FUMARATE 50 MG/1
50 TABLET, FILM COATED ORAL
Qty: 30 TABLET | Refills: 3 | Status: SHIPPED | OUTPATIENT
Start: 2022-11-08

## 2022-11-08 RX ORDER — DOCUSATE SODIUM 100 MG/1
100 CAPSULE, LIQUID FILLED ORAL 2 TIMES DAILY
Qty: 60 CAPSULE | Refills: 2 | Status: SHIPPED | OUTPATIENT
Start: 2022-11-08 | End: 2023-02-06

## 2022-11-08 RX ORDER — SERTRALINE HYDROCHLORIDE 50 MG/1
50 TABLET, FILM COATED ORAL DAILY
Qty: 30 TABLET | Refills: 3 | Status: SHIPPED | OUTPATIENT
Start: 2022-11-08

## 2022-11-08 RX ORDER — LORATADINE 10 MG/1
10 TABLET ORAL
Qty: 30 TABLET | Refills: 1 | Status: SHIPPED | OUTPATIENT
Start: 2022-11-08

## 2022-11-08 NOTE — PROGRESS NOTES
Cat Avila (: 1950) is a 80 y.o. female, new patient, here for evaluation of the following chief complaint(s):  New Patient (Establish care /Neurology referral /Need something to help regulated bowel patterns) and Medication Refill (Omeprazole )       ASSESSMENT/PLAN:  Below is the assessment and plan developed based on review of pertinent history, physical exam, labs, studies, and medications. 1. Encounter to establish care  Referral to neurology per daughter's request for progressive Alzheimer's dementia with behavioral disturbance. Increasing Seroquel 25 to 50 mg q. nightly and sertraline 25 to 50 mg daily. I have written a letter in support of daughter having custody of her mother. Discussed palliative care and at this time daughter is undecided will update us after thinking through things and discussion with family. Omeprazole refilled. Colace 100 mg twice daily for constipation, MiraLAX as needed. Claritin 10 mg as needed for allergies. Unable to check blood pressure due to patient moving during measurement. Daughter states she checked blood pressure yesterday. Reading was 140/90 mmHg. No need for medication as long as blood pressure under 150/90 mmHg. 2. Alzheimer's dementia, unspecified dementia severity, unspecified timing of dementia onset, unspecified whether behavioral, psychotic, or mood disturbance or anxiety (HCC)  Chronic  -     REFERRAL TO NEUROLOGY  -     QUEtiapine (SEROquel) 50 mg tablet; Take 1 Tablet by mouth nightly., Normal, Disp-30 Tablet, R-3  3. Essential hypertension  Chronic  4. Hyperlipidemia, unspecified hyperlipidemia type  Chronic  To reduce polypharmacy, statin discontinued. 5. Anemia, unspecified type  Chronic  Likely anemia of chronic disease versus slow GI bleed, daughter declines further work-up. 6. Gastroesophageal reflux disease, unspecified whether esophagitis present  Chronic  -     Omeprazole delayed release (PRILOSEC D/R) 20 mg tablet;  Take 1 Tablet by mouth daily. , Normal, Disp-30 Tablet, R-3  PPI refilled. 7. Chronic constipation  Chronic  -     docusate sodium (COLACE) 100 mg capsule; Take 1 Capsule by mouth two (2) times a day for 90 days. , Normal, Disp-60 Capsule, R-2  8. Environmental and seasonal allergies  Chronic  -     loratadine (CLARITIN) 10 mg tablet; Take 1 Tablet by mouth daily as needed for Allergies. , Normal, Disp-30 Tablet, R-1  9. Bowel and bladder incontinence  Chronic      Return in about 3 months (around 2/8/2023) for chronic care follow up. SUBJECTIVE/OBJECTIVE:  HPI    13-year-old female past medical history notable for Alzheimer's dementia, hypertension, hyperlipidemia, anemia, GERD, constipation, seasonal allergies, functional incontinence presents to the office with her daughter for chief complaint rapid memory decline associated with behavioral disturbance. Patient is here with her daughter Purvi Woods. Sumeet Tuttle is in process of getting guardianship. Patient has been living with daughter since April 2022. Prior to April, patient was living with 2 aunts. Patient moved in with daughter an aunt passed. Per daughter, memory decline began around 3 years ago. She states patient was mostly independent and chose to live with her 2 sisters. Per daughter, patient's mentation declined after the passing of her aunt this year. She was not able to function as before and behavior became concerning. She was admitted recently to the hospital for suspected UTI and dehydration due to poor PO intake. Per daughter, patient speaks incoherently and has nonlinear. She often proclaims, \"God is good\" over and over again. She has sundowning and has been found by her daughter inserting various objects into electrical outlets found throughout the home. Patient is incontinent of urine and stool and wears adult diapers. Daughter has been giving her MiraLAX to relieve constipation.   Daughter wary of continuing MiraLAX as it causes loose explosive diarrhea. Per daughter, patient sneezes and coughs on occasion especially outdoors. Daughter would like neurology to see patient and confirm diagnosis that will assist her in getting guardianship of her mother. No Known Allergies  Current Outpatient Medications   Medication Sig    loratadine (CLARITIN) 10 mg tablet Take 1 Tablet by mouth daily as needed for Allergies. docusate sodium (COLACE) 100 mg capsule Take 1 Capsule by mouth two (2) times a day for 90 days. sertraline (ZOLOFT) 50 mg tablet Take 1 Tablet by mouth daily. Omeprazole delayed release (PRILOSEC D/R) 20 mg tablet Take 1 Tablet by mouth daily. QUEtiapine (SEROquel) 50 mg tablet Take 1 Tablet by mouth nightly. folic acid 720 mcg tablet Take 800 mcg by mouth daily. donepeziL (ARICEPT) 10 mg tablet Take 10 mg by mouth nightly. memantine (NAMENDA) 10 mg tablet Take 10 mg by mouth two (2) times a day. multivitamin, tx-iron-ca-min (THERA-M w/ IRON) 9 mg iron-400 mcg tab tablet Take 1 Tablet by mouth daily. No current facility-administered medications for this visit. Past Medical History:   Diagnosis Date    Alzheimer's dementia ([facility] [address]     Hyperlipidemia     Hypertension     Polypharmacy      History reviewed. No pertinent surgical history. History reviewed. No pertinent family history. Social History     Tobacco Use   Smoking Status Never   Smokeless Tobacco Never         Review of Systems  Unable to obtain review of systems due to patient's mental condition. Pulse 69   Resp 16   Ht 5' 2\" (1.575 m)   Wt 123 lb (55.8 kg)   SpO2 99%   BMI 22.50 kg/m²    Physical Exam  Vitals reviewed. Constitutional:       Appearance: She is ill-appearing. HENT:      Head: Normocephalic and atraumatic. Eyes:      Conjunctiva/sclera: Conjunctivae normal.   Cardiovascular:      Rate and Rhythm: Normal rate and regular rhythm. Pulses: Normal pulses. Heart sounds: Normal heart sounds. Pulmonary:      Effort: Pulmonary effort is normal.      Breath sounds: Normal breath sounds. Abdominal:      General: Bowel sounds are normal.      Palpations: Abdomen is soft. Musculoskeletal:      Cervical back: Neck supple. Skin:     Capillary Refill: Capillary refill takes less than 2 seconds. Neurological:      Mental Status: She is alert. Psychiatric:         Cognition and Memory: Memory is impaired. On this date 11/08/2022 I have spent 45 minutes reviewing previous notes, test results and face to face with the patient discussing the diagnosis and importance of compliance with the treatment plan as well as documenting on the day of the visit. An electronic signature was used to authenticate this note.   -- Emy Wilkes MD   [facility] [id number]  [address]

## 2022-11-08 NOTE — LETTER
2022 5:42 PM    Ms. Preethi Braun  [address] [facility] [address]    To Whom It May Concern:    Mrs. Preethi Braun ( 1950) is a patient of mine at [facility]. Patient has several chronic, debilitating medical conditions for which she needs constant care. Mrs. Monalisa Hernandez daughter, Mrs. Shahrzad Vega, is currently her primary caregiver. Cristhian has been providing essential services and care for her mother. It has come to my attention Cristhian is seeking guardianship. Based on my assessment today of Mrs. Preethi Braun, I believe this is a very sound decision as Mrs. Preethi Braun is unable to care for herself and perform tasks in her daily life safely and make prudent, rational decisions. I can be reached at the number listed above in case there are any questions.           Sincerely,      Carin Blanca MD

## 2022-11-08 NOTE — PROGRESS NOTES
Chief Complaint   Patient presents with    New Patient     Establish care   Neurology referral   Need something to help regulated bowel patterns    Medication Refill     Omeprazole        Visit Vitals  Pulse 69   Resp 16   Ht 5' 2\" (1.575 m)   Wt 123 lb (55.8 kg)   SpO2 99%   BMI 22.50 kg/m²         1. \"Have you been to the ER, urgent care clinic since your last visit? Hospitalized since your last visit? \" No    2. \"Have you seen or consulted any other health care providers outside of the [address] [place] [address] since your last visit? \" No     3. For patients aged 39-70: Has the patient had a colonoscopy / FIT/ Cologuard? No      If the patient is female:    4. For patients aged 41-77: Has the patient had a mammogram within the past 2 years? NA - based on age or sex      11. For patients aged 21-65: Has the patient had a pap smear?  NA - based on age or sex

## 2023-01-26 DIAGNOSIS — K21.9 GASTROESOPHAGEAL REFLUX DISEASE, UNSPECIFIED WHETHER ESOPHAGITIS PRESENT: ICD-10-CM

## 2023-01-26 DIAGNOSIS — J30.89 ENVIRONMENTAL AND SEASONAL ALLERGIES: ICD-10-CM

## 2023-01-30 RX ORDER — LORATADINE 10 MG/1
10 TABLET ORAL
Qty: 90 TABLET | Refills: 1 | Status: SHIPPED | OUTPATIENT
Start: 2023-01-30

## 2023-01-30 RX ORDER — PHENOL/SODIUM PHENOLATE
AEROSOL, SPRAY (ML) MUCOUS MEMBRANE
Qty: 90 TABLET | Refills: 1 | Status: SHIPPED | OUTPATIENT
Start: 2023-01-30

## 2023-03-01 DIAGNOSIS — G30.9 ALZHEIMER'S DEMENTIA, UNSPECIFIED DEMENTIA SEVERITY, UNSPECIFIED TIMING OF DEMENTIA ONSET, UNSPECIFIED WHETHER BEHAVIORAL, PSYCHOTIC, OR MOOD DISTURBANCE OR ANXIETY (HCC): ICD-10-CM

## 2023-03-01 DIAGNOSIS — F02.80 ALZHEIMER'S DEMENTIA, UNSPECIFIED DEMENTIA SEVERITY, UNSPECIFIED TIMING OF DEMENTIA ONSET, UNSPECIFIED WHETHER BEHAVIORAL, PSYCHOTIC, OR MOOD DISTURBANCE OR ANXIETY (HCC): ICD-10-CM

## 2023-03-01 RX ORDER — QUETIAPINE FUMARATE 50 MG/1
50 TABLET, FILM COATED ORAL
Qty: 90 TABLET | Refills: 0 | Status: SHIPPED | OUTPATIENT
Start: 2023-03-01

## 2023-06-09 ENCOUNTER — APPOINTMENT (OUTPATIENT)
Facility: HOSPITAL | Age: 73
End: 2023-06-09
Payer: MEDICARE

## 2023-06-09 ENCOUNTER — HOSPITAL ENCOUNTER (EMERGENCY)
Facility: HOSPITAL | Age: 73
Discharge: HOME | End: 2023-06-09
Attending: EMERGENCY MEDICINE
Payer: MEDICARE

## 2023-06-09 VITALS
WEIGHT: 110 LBS | HEIGHT: 62 IN | DIASTOLIC BLOOD PRESSURE: 83 MMHG | HEART RATE: 75 BPM | TEMPERATURE: 98 F | BODY MASS INDEX: 20.24 KG/M2 | RESPIRATION RATE: 16 BRPM | OXYGEN SATURATION: 95 % | SYSTOLIC BLOOD PRESSURE: 132 MMHG

## 2023-06-09 DIAGNOSIS — M17.10 ARTHRITIS OF KNEE: Primary | ICD-10-CM

## 2023-06-09 PROCEDURE — 73562 X-RAY EXAM OF KNEE 3: CPT

## 2023-06-09 PROCEDURE — 6370000000 HC RX 637 (ALT 250 FOR IP): Performed by: EMERGENCY MEDICINE

## 2023-06-09 PROCEDURE — 73502 X-RAY EXAM HIP UNI 2-3 VIEWS: CPT

## 2023-06-09 RX ORDER — ACETAMINOPHEN 500 MG
1000 TABLET ORAL 3 TIMES DAILY
Qty: 120 TABLET | Refills: 3 | Status: SHIPPED | OUTPATIENT
Start: 2023-06-09

## 2023-06-09 RX ORDER — ACETAMINOPHEN 500 MG
1000 TABLET ORAL
Status: COMPLETED | OUTPATIENT
Start: 2023-06-09 | End: 2023-06-09

## 2023-06-09 RX ADMIN — ACETAMINOPHEN 1000 MG: 500 TABLET ORAL at 22:49

## 2023-06-09 ASSESSMENT — PAIN SCALES - WONG BAKER: WONGBAKER_NUMERICALRESPONSE: 2

## 2023-06-10 NOTE — ED NOTES
Patient was discharged at 2312 . Patient caregiver (daughter) verbalized understanding of all discharge instructions. Patient alert with no acute distress when leaving ED. Patient in wheelchair accompanied by caregiver (daughter)when leaving ED.          Ines Toscano RN  06/09/23 [id number]

## 2023-06-10 NOTE — ED PROVIDER NOTES
Disp-120 tablet, R-3Normal               (Please note that portions of this note were completed with a voice recognition program.  Efforts were made to edit the dictations but occasionally words are mis-transcribed.)    Olivier Vincent MD (electronically signed)  Emergency Attending Physician           Olivier Vincent MD  06/10/23 0979

## 2023-06-10 NOTE — ED TRIAGE NOTES
Pt to ed from home with daughter. Daughter states pt woke up this am with c/o pain to the right knee and swelling. Pt was able to go back to bed without further complaints. States pt woke up  again tonight c/o of the same thing. Daughter reports that her knee/leg looks like is \"bowing\" into the other leg. Pt able to get up from wheelchair with help from daughter and ambulated to the stretcher without difficulty.

## 2023-08-05 DIAGNOSIS — G30.9 ALZHEIMER'S DISEASE, UNSPECIFIED (CODE) (HCC): ICD-10-CM

## 2023-08-07 RX ORDER — QUETIAPINE FUMARATE 50 MG/1
TABLET, FILM COATED ORAL NIGHTLY
Qty: 30 TABLET | Refills: 0 | Status: SHIPPED | OUTPATIENT
Start: 2023-08-07 | End: 2023-09-11 | Stop reason: SDUPTHER

## 2023-08-07 RX ORDER — LORATADINE 10 MG/1
TABLET ORAL
Qty: 30 TABLET | Refills: 0 | Status: SHIPPED | OUTPATIENT
Start: 2023-08-07 | End: 2023-09-07

## 2023-08-07 RX ORDER — NICOTINE POLACRILEX 4 MG/1
GUM, CHEWING ORAL
Qty: 30 TABLET | Refills: 0 | Status: SHIPPED | OUTPATIENT
Start: 2023-08-07 | End: 2023-08-23

## 2023-08-23 RX ORDER — NICOTINE POLACRILEX 4 MG/1
GUM, CHEWING ORAL
Qty: 30 TABLET | Refills: 0 | Status: SHIPPED | OUTPATIENT
Start: 2023-08-23 | End: 2023-09-11 | Stop reason: SDUPTHER

## 2023-09-07 ENCOUNTER — TELEPHONE (OUTPATIENT)
Dept: PRIMARY CARE CLINIC | Facility: CLINIC | Age: 73
End: 2023-09-07

## 2023-09-07 DIAGNOSIS — G30.9 ALZHEIMER'S DISEASE, UNSPECIFIED (CODE) (HCC): ICD-10-CM

## 2023-09-07 RX ORDER — QUETIAPINE FUMARATE 50 MG/1
TABLET, FILM COATED ORAL
Qty: 30 TABLET | Refills: 0 | OUTPATIENT
Start: 2023-09-07

## 2023-09-07 RX ORDER — LORATADINE 10 MG/1
TABLET ORAL
Qty: 30 TABLET | Refills: 0 | Status: SHIPPED | OUTPATIENT
Start: 2023-09-07

## 2023-09-08 ENCOUNTER — TELEPHONE (OUTPATIENT)
Dept: PRIMARY CARE CLINIC | Facility: CLINIC | Age: 73
End: 2023-09-08

## 2023-09-08 NOTE — TELEPHONE ENCOUNTER
----- Message from Renaldo Blair sent at 9/8/2023 11:09 AM EDT -----  Subject: Message to Provider    QUESTIONS  Information for Provider? patient is currently scheduled to come in on   9/25 for medication refill appointment. Can we please refill the patients   meds today. She is down to her last dose.   ---------------------------------------------------------------------------  --------------  Austin Ballesteros INFO  [phone]; OK to leave message on voicemail  ---------------------------------------------------------------------------  --------------  SCRIPT ANSWERS  Relationship to Patient? Other/Third Party  Representative Name? daughter- Phyllis Bradford  Is the representative on the Communication Release of Information (EDGAR)   form in Epic?  Yes

## 2023-09-08 NOTE — TELEPHONE ENCOUNTER
Ricardo Akhtar is requesting a refill on Quetiapine . Currently has 7 days remaining.   Patient's last appointment was 11/8/2022  Next visit is scheduled for 9/25/2023   Please send medication to:   [facility]/pharmacy #[id number]- [address]  Phone: [phone] Fax: [phone]

## 2023-09-15 RX ORDER — MULTIVITAMIN/IRON/FOLIC ACID 18MG-0.4MG
20 TABLET ORAL DAILY
Qty: 30 TABLET | Refills: 0 | OUTPATIENT
Start: 2023-09-15

## 2023-09-25 ENCOUNTER — OFFICE VISIT (OUTPATIENT)
Dept: PRIMARY CARE CLINIC | Facility: CLINIC | Age: 73
End: 2023-09-25
Payer: MEDICARE

## 2023-09-25 VITALS
TEMPERATURE: 98 F | WEIGHT: 124.4 LBS | BODY MASS INDEX: 22.89 KG/M2 | DIASTOLIC BLOOD PRESSURE: 76 MMHG | HEIGHT: 62 IN | HEART RATE: 101 BPM | SYSTOLIC BLOOD PRESSURE: 129 MMHG

## 2023-09-25 DIAGNOSIS — R82.998 LEUKOCYTES IN URINE: ICD-10-CM

## 2023-09-25 DIAGNOSIS — D64.9 ANEMIA, UNSPECIFIED TYPE: ICD-10-CM

## 2023-09-25 DIAGNOSIS — I10 ESSENTIAL HYPERTENSION: Primary | Chronic | ICD-10-CM

## 2023-09-25 DIAGNOSIS — Z91.81 AT HIGH RISK FOR FALLS: ICD-10-CM

## 2023-09-25 DIAGNOSIS — L75.0 URINARY BODY ODOR: ICD-10-CM

## 2023-09-25 DIAGNOSIS — N30.01 ACUTE CYSTITIS WITH HEMATURIA: ICD-10-CM

## 2023-09-25 DIAGNOSIS — R39.81 FUNCTIONAL URINARY INCONTINENCE: ICD-10-CM

## 2023-09-25 DIAGNOSIS — F02.C18 SEVERE EARLY ONSET ALZHEIMER'S DEMENTIA WITH OTHER BEHAVIORAL DISTURBANCE (HCC): ICD-10-CM

## 2023-09-25 DIAGNOSIS — G30.0 SEVERE EARLY ONSET ALZHEIMER'S DEMENTIA WITH OTHER BEHAVIORAL DISTURBANCE (HCC): ICD-10-CM

## 2023-09-25 DIAGNOSIS — K21.9 GASTRO-ESOPHAGEAL REFLUX DISEASE WITHOUT ESOPHAGITIS: ICD-10-CM

## 2023-09-25 PROCEDURE — 1123F ACP DISCUSS/DSCN MKR DOCD: CPT | Performed by: NURSE PRACTITIONER

## 2023-09-25 PROCEDURE — 3078F DIAST BP <80 MM HG: CPT | Performed by: NURSE PRACTITIONER

## 2023-09-25 PROCEDURE — 99215 OFFICE O/P EST HI 40 MIN: CPT | Performed by: NURSE PRACTITIONER

## 2023-09-25 PROCEDURE — 3074F SYST BP LT 130 MM HG: CPT | Performed by: NURSE PRACTITIONER

## 2023-09-25 PROCEDURE — 81003 URINALYSIS AUTO W/O SCOPE: CPT | Performed by: NURSE PRACTITIONER

## 2023-09-25 RX ORDER — DONEPEZIL HYDROCHLORIDE 10 MG/1
10 TABLET, FILM COATED ORAL NIGHTLY
Qty: 90 TABLET | Refills: 0 | Status: SHIPPED | OUTPATIENT
Start: 2023-09-25

## 2023-09-25 RX ORDER — QUETIAPINE FUMARATE 100 MG/1
100 TABLET, FILM COATED ORAL NIGHTLY
Qty: 90 TABLET | Refills: 0 | Status: SHIPPED | OUTPATIENT
Start: 2023-09-25

## 2023-09-25 RX ORDER — ATORVASTATIN CALCIUM 10 MG/1
TABLET, FILM COATED ORAL
COMMUNITY
Start: 2023-08-28 | End: 2023-09-25 | Stop reason: ALTCHOICE

## 2023-09-25 RX ORDER — MEMANTINE HYDROCHLORIDE 10 MG/1
10 TABLET ORAL 2 TIMES DAILY
Qty: 180 TABLET | Refills: 0 | Status: SHIPPED | OUTPATIENT
Start: 2023-09-25

## 2023-09-25 RX ORDER — AMLODIPINE BESYLATE AND ATORVASTATIN CALCIUM 10; 40 MG/1; MG/1
1 TABLET, FILM COATED ORAL DAILY
COMMUNITY
End: 2023-09-25

## 2023-09-25 RX ORDER — LANOLIN ALCOHOL/MO/W.PET/CERES
800 CREAM (GRAM) TOPICAL DAILY
Qty: 90 TABLET | Refills: 0 | Status: SHIPPED | OUTPATIENT
Start: 2023-09-25

## 2023-09-25 RX ORDER — OXYGEN 99 L/100L
GAS RESPIRATORY (INHALATION)
Qty: 180 EACH | Refills: 11 | Status: SHIPPED | OUTPATIENT
Start: 2023-09-25

## 2023-09-25 SDOH — ECONOMIC STABILITY: FOOD INSECURITY: WITHIN THE PAST 12 MONTHS, YOU WORRIED THAT YOUR FOOD WOULD RUN OUT BEFORE YOU GOT MONEY TO BUY MORE.: SOMETIMES TRUE

## 2023-09-25 SDOH — ECONOMIC STABILITY: TRANSPORTATION INSECURITY
IN THE PAST 12 MONTHS, HAS LACK OF TRANSPORTATION KEPT YOU FROM MEETINGS, WORK, OR FROM GETTING THINGS NEEDED FOR DAILY LIVING?: NO

## 2023-09-25 SDOH — ECONOMIC STABILITY: FOOD INSECURITY: WITHIN THE PAST 12 MONTHS, YOU WORRIED THAT YOUR FOOD WOULD RUN OUT BEFORE YOU GOT MONEY TO BUY MORE.: PATIENT DECLINED

## 2023-09-25 SDOH — ECONOMIC STABILITY: INCOME INSECURITY: HOW HARD IS IT FOR YOU TO PAY FOR THE VERY BASICS LIKE FOOD, HOUSING, MEDICAL CARE, AND HEATING?: PATIENT DECLINED

## 2023-09-25 SDOH — ECONOMIC STABILITY: FOOD INSECURITY: WITHIN THE PAST 12 MONTHS, THE FOOD YOU BOUGHT JUST DIDN'T LAST AND YOU DIDN'T HAVE MONEY TO GET MORE.: PATIENT DECLINED

## 2023-09-25 SDOH — ECONOMIC STABILITY: HOUSING INSECURITY
IN THE LAST 12 MONTHS, WAS THERE A TIME WHEN YOU DID NOT HAVE A STEADY PLACE TO SLEEP OR SLEPT IN A SHELTER (INCLUDING NOW)?: PATIENT REFUSED

## 2023-09-25 SDOH — ECONOMIC STABILITY: INCOME INSECURITY: HOW HARD IS IT FOR YOU TO PAY FOR THE VERY BASICS LIKE FOOD, HOUSING, MEDICAL CARE, AND HEATING?: NOT VERY HARD

## 2023-09-25 SDOH — ECONOMIC STABILITY: FOOD INSECURITY: WITHIN THE PAST 12 MONTHS, THE FOOD YOU BOUGHT JUST DIDN'T LAST AND YOU DIDN'T HAVE MONEY TO GET MORE.: NEVER TRUE

## 2023-09-25 SDOH — ECONOMIC STABILITY: HOUSING INSECURITY
IN THE LAST 12 MONTHS, WAS THERE A TIME WHEN YOU DID NOT HAVE A STEADY PLACE TO SLEEP OR SLEPT IN A SHELTER (INCLUDING NOW)?: NO

## 2023-09-25 ASSESSMENT — PATIENT HEALTH QUESTIONNAIRE - PHQ9
1. LITTLE INTEREST OR PLEASURE IN DOING THINGS: 1
SUM OF ALL RESPONSES TO PHQ QUESTIONS 1-9: 2
SUM OF ALL RESPONSES TO PHQ QUESTIONS 1-9: 2
2. FEELING DOWN, DEPRESSED OR HOPELESS: 1
SUM OF ALL RESPONSES TO PHQ QUESTIONS 1-9: 2
SUM OF ALL RESPONSES TO PHQ QUESTIONS 1-9: 2
SUM OF ALL RESPONSES TO PHQ9 QUESTIONS 1 & 2: 2

## 2023-09-25 ASSESSMENT — LIFESTYLE VARIABLES
HOW MANY STANDARD DRINKS CONTAINING ALCOHOL DO YOU HAVE ON A TYPICAL DAY: PATIENT DOES NOT DRINK
HOW OFTEN DO YOU HAVE A DRINK CONTAINING ALCOHOL: NEVER
HOW OFTEN DO YOU HAVE A DRINK CONTAINING ALCOHOL: NEVER
HOW MANY STANDARD DRINKS CONTAINING ALCOHOL DO YOU HAVE ON A TYPICAL DAY: PATIENT DOES NOT DRINK

## 2023-09-25 NOTE — PROGRESS NOTES
Thuy Reynolds is a 71 y.o. female who was seen in clinic today (9/25/2023). Assessment & Plan:   Below is the assessment and plan developed based on review of pertinent history, physical exam, labs, studies, and medications. 1. Essential hypertension  Comments:  stable  Orders:  -     Comprehensive Metabolic Panel  2. At high risk for falls  3. Severe early onset Alzheimer's dementia with other behavioral disturbance [facility])  Comments:  has apt upcoming with neurology in Feb, will check for cancellations  Orders:  -     sertraline (ZOLOFT) 50 MG tablet; Take 1 tablet by mouth daily, Disp-90 tablet, R-0Normal  -     QUEtiapine (SEROQUEL) 100 MG tablet; Take 1 tablet by mouth nightly, Disp-90 tablet, R-0Normal  -     memantine (NAMENDA) 10 MG tablet; Take 1 tablet by mouth 2 times daily, Disp-180 tablet, R-0Normal  -     donepezil (ARICEPT) 10 MG tablet; Take 1 tablet by mouth nightly, Disp-90 tablet, R-0Normal  -     AMB POC URINALYSIS DIP STICK AUTO W/O MICRO  -     TSH  4. Anemia, unspecified type  Comments:  will check labs  Orders:  -     folic acid (FOLVITE) 960 MCG tablet; Take 2 tablets by mouth daily, Disp-90 tablet, R-0Normal  -     CBC  -     Vitamin B12  5. Functional urinary incontinence  Comments:  will order briefs for patient. Orders:  -     Incontinence Supply Disposable (FITTED BRIEFS MEDIUM) MISC; Disp-180 each, R-11, PrintUse as directed daily  6. Urinary body odor  Comments:  unable to leave sample, given cup and will bring back sample to be checked      Return in about 6 months (around 3/25/2024) for With PCP, Dr. Gabriel Schneider for Chronic OV. On this date 9/25/2023 I have spent 40 minutes reviewing previous notes, test results and face to face with the patient discussing the diagnosis and plan of care as well as documenting on the day of the visit. Subjective:   Kvng Wagner was seen today for Follow-up     Patient presents with her daughter Briana Downs who is her legal guardian.    She

## 2023-09-26 LAB
ALBUMIN SERPL-MCNC: 4.9 G/DL (ref 3.8–4.8)
ALBUMIN/GLOB SERPL: 1.5 {RATIO} (ref 1.2–2.2)
ALP SERPL-CCNC: 74 IU/L (ref 44–121)
ALT SERPL-CCNC: 18 IU/L (ref 0–32)
AST SERPL-CCNC: 22 IU/L (ref 0–40)
BILIRUB SERPL-MCNC: 0.5 MG/DL (ref 0–1.2)
BILIRUBIN, URINE, POC: NEGATIVE
BLOOD URINE, POC: NEGATIVE
BUN SERPL-MCNC: 25 MG/DL (ref 8–27)
BUN/CREAT SERPL: 15 (ref 12–28)
CALCIUM SERPL-MCNC: 10.2 MG/DL (ref 8.7–10.3)
CHLORIDE SERPL-SCNC: 100 MMOL/L (ref 96–106)
CO2 SERPL-SCNC: 26 MMOL/L (ref 20–29)
CREAT SERPL-MCNC: 1.65 MG/DL (ref 0.57–1)
EGFRCR SERPLBLD CKD-EPI 2021: 33 ML/MIN/1.73
ERYTHROCYTE [DISTWIDTH] IN BLOOD BY AUTOMATED COUNT: 11.3 % (ref 11.7–15.4)
GLOBULIN SER CALC-MCNC: 3.2 G/DL (ref 1.5–4.5)
GLUCOSE SERPL-MCNC: 102 MG/DL (ref 70–99)
GLUCOSE URINE, POC: NEGATIVE
HCT VFR BLD AUTO: 38.7 % (ref 34–46.6)
HGB BLD-MCNC: 12.6 G/DL (ref 11.1–15.9)
KETONES, URINE, POC: NEGATIVE
LEUKOCYTE ESTERASE, URINE, POC: ABNORMAL
MCH RBC QN AUTO: 30.7 PG (ref 26.6–33)
MCHC RBC AUTO-ENTMCNC: 32.6 G/DL (ref 31.5–35.7)
MCV RBC AUTO: 94 FL (ref 79–97)
NITRITE, URINE, POC: NEGATIVE
PH, URINE, POC: 6 (ref 4.6–8)
PLATELET # BLD AUTO: 450 X10E3/UL (ref 150–450)
POTASSIUM SERPL-SCNC: 3.7 MMOL/L (ref 3.5–5.2)
PROT SERPL-MCNC: 8.1 G/DL (ref 6–8.5)
PROTEIN,URINE, POC: 30
RBC # BLD AUTO: 4.11 X10E6/UL (ref 3.77–5.28)
SODIUM SERPL-SCNC: 144 MMOL/L (ref 134–144)
SPECIFIC GRAVITY, URINE, POC: 1.03 (ref 1–1.03)
TSH SERPL DL<=0.005 MIU/L-ACNC: 0.81 UIU/ML (ref 0.45–4.5)
URINALYSIS CLARITY, POC: ABNORMAL
URINALYSIS COLOR, POC: ABNORMAL
UROBILINOGEN, POC: ABNORMAL
VIT B12 SERPL-MCNC: >2000 PG/ML (ref 232–1245)
WBC # BLD AUTO: 9.4 X10E3/UL (ref 3.4–10.8)

## 2023-09-28 LAB — BACTERIA UR CULT: ABNORMAL

## 2023-09-28 RX ORDER — DOXYCYCLINE HYCLATE 100 MG
100 TABLET ORAL 2 TIMES DAILY
Qty: 14 TABLET | Refills: 0 | Status: SHIPPED | OUTPATIENT
Start: 2023-09-28 | End: 2023-10-05

## 2023-09-28 RX ORDER — NICOTINE POLACRILEX 4 MG/1
20 GUM, CHEWING ORAL DAILY
Qty: 90 TABLET | Refills: 2 | Status: SHIPPED | OUTPATIENT
Start: 2023-09-28

## 2023-10-09 DIAGNOSIS — G30.9 ALZHEIMER'S DISEASE, UNSPECIFIED (CODE) (HCC): ICD-10-CM

## 2023-10-09 RX ORDER — QUETIAPINE FUMARATE 50 MG/1
50 TABLET, FILM COATED ORAL
Qty: 30 TABLET | Refills: 0 | OUTPATIENT
Start: 2023-10-09

## 2023-11-09 ENCOUNTER — TELEPHONE (OUTPATIENT)
Dept: PRIMARY CARE CLINIC | Facility: CLINIC | Age: 73
End: 2023-11-09

## 2023-11-09 NOTE — TELEPHONE ENCOUNTER
Megan Falcon is requesting a refill on loratadine .      Patient's last appointment was 9/25/23  Next visit is scheduled for Visit date not found   Please send medication to:   [facility]/pharmacy #[id number] [facility], [address] [place] [address] [phone] [facility] [phone]  [address] [place] [address] Drive  Phone: [phone] Fax: [phone]

## 2023-11-10 RX ORDER — LORATADINE 10 MG/1
TABLET ORAL
Qty: 90 TABLET | Refills: 1 | Status: SHIPPED | OUTPATIENT
Start: 2023-11-10

## 2024-01-01 RX ORDER — OMEPRAZOLE 20 MG/1
CAPSULE, DELAYED RELEASE ORAL DAILY
Qty: 30 CAPSULE | OUTPATIENT
Start: 2024-01-01

## 2024-01-12 DIAGNOSIS — G30.0 SEVERE EARLY ONSET ALZHEIMER'S DEMENTIA WITH OTHER BEHAVIORAL DISTURBANCE (HCC): ICD-10-CM

## 2024-01-12 DIAGNOSIS — F02.C18 SEVERE EARLY ONSET ALZHEIMER'S DEMENTIA WITH OTHER BEHAVIORAL DISTURBANCE (HCC): ICD-10-CM

## 2024-02-11 DIAGNOSIS — F02.C18 SEVERE EARLY ONSET ALZHEIMER'S DEMENTIA WITH OTHER BEHAVIORAL DISTURBANCE (HCC): ICD-10-CM

## 2024-02-11 DIAGNOSIS — G30.0 SEVERE EARLY ONSET ALZHEIMER'S DEMENTIA WITH OTHER BEHAVIORAL DISTURBANCE (HCC): ICD-10-CM

## 2024-02-12 RX ORDER — MEMANTINE HYDROCHLORIDE 10 MG/1
10 TABLET ORAL 2 TIMES DAILY
Qty: 180 TABLET | Refills: 0 | Status: SHIPPED | OUTPATIENT
Start: 2024-02-12

## 2024-02-20 ENCOUNTER — OFFICE VISIT (OUTPATIENT)
Age: 74
End: 2024-02-20
Payer: MEDICARE

## 2024-02-20 VITALS
OXYGEN SATURATION: 95 % | HEART RATE: 78 BPM | RESPIRATION RATE: 18 BRPM | DIASTOLIC BLOOD PRESSURE: 112 MMHG | SYSTOLIC BLOOD PRESSURE: 147 MMHG

## 2024-02-20 DIAGNOSIS — G30.0 SEVERE EARLY ONSET ALZHEIMER'S DEMENTIA WITH OTHER BEHAVIORAL DISTURBANCE (HCC): ICD-10-CM

## 2024-02-20 DIAGNOSIS — F02.C18 SEVERE EARLY ONSET ALZHEIMER'S DEMENTIA WITH OTHER BEHAVIORAL DISTURBANCE (HCC): ICD-10-CM

## 2024-02-20 PROCEDURE — 1123F ACP DISCUSS/DSCN MKR DOCD: CPT | Performed by: PSYCHIATRY & NEUROLOGY

## 2024-02-20 PROCEDURE — 3077F SYST BP >= 140 MM HG: CPT | Performed by: PSYCHIATRY & NEUROLOGY

## 2024-02-20 PROCEDURE — 99205 OFFICE O/P NEW HI 60 MIN: CPT | Performed by: PSYCHIATRY & NEUROLOGY

## 2024-02-20 PROCEDURE — 3080F DIAST BP >= 90 MM HG: CPT | Performed by: PSYCHIATRY & NEUROLOGY

## 2024-02-20 RX ORDER — AMLODIPINE BESYLATE 10 MG/1
10 TABLET ORAL DAILY PRN
COMMUNITY

## 2024-02-20 RX ORDER — QUETIAPINE FUMARATE 50 MG/1
TABLET, FILM COATED ORAL
Qty: 120 TABLET | Refills: 5 | Status: SHIPPED | OUTPATIENT
Start: 2024-02-20

## 2024-02-20 RX ORDER — ATORVASTATIN CALCIUM 10 MG/1
10 TABLET, FILM COATED ORAL DAILY
COMMUNITY
Start: 2023-11-27

## 2024-02-20 ASSESSMENT — PATIENT HEALTH QUESTIONNAIRE - PHQ9
SUM OF ALL RESPONSES TO PHQ QUESTIONS 1-9: 0
SUM OF ALL RESPONSES TO PHQ9 QUESTIONS 1 & 2: 0
1. LITTLE INTEREST OR PLEASURE IN DOING THINGS: 0
2. FEELING DOWN, DEPRESSED OR HOPELESS: 0

## 2024-02-20 NOTE — PROGRESS NOTES
[facility] at [facility]ics at [address] [place] [address] [address] [facility] [address] [place] [address] [phone] Office  [phone] Facsimile           Referring: Maciel Byrnes MD      Chief Complaint   Patient presents with    New Patient     Presents with Mari Hays, her daughter today.    Dementia     Pt's daughter states she recently got custody of pt last year.  Other family members did not really discuss extent of pt's cognitive deficts but states she did notice some decline prior to COVID during visits.  Pt may be combative at times.  Understands some things, daughter states pt can be small child-like with cognition     73-year-old lady presents today accompanied by her daughter for initial neurologic consultation regarding worsening dementia.  Her daughter provides all history as the patient is unable.  The patient came to live with her daughter in the summer 2022 after previously living with 3 of her sisters.  The patient's sisters apparently knew the patient was suffering from dementia but did not let her daughter know.  When her daughter would call the onset would make excuses about the patient not being able to hear.  In any regard one of the sisters came down with cancer and the patient's daughter was told that she needed to  her mother or they would have to put her in a nursing home.  In retrospect the daughter says that in 2015 the patient came to visit and did not get off of the train in [place].  Instead she was called to come pick her mother up and DC as she missed the stop in [place].  The patient explained that she had fallen asleep on the train and that was a reasonable explanation.  The patient's daughter acquired custody of her mother recently.  When the patient was moved back to [place] her , from home she has been estranged told the

## 2024-02-22 ENCOUNTER — TELEPHONE (OUTPATIENT)
Age: 74
End: 2024-02-22

## 2024-02-23 ENCOUNTER — TELEPHONE (OUTPATIENT)
Dept: PRIMARY CARE CLINIC | Facility: CLINIC | Age: 74
End: 2024-02-23

## 2024-02-23 NOTE — TELEPHONE ENCOUNTER
Pt said her neurologist told her we need to approve her changing the quetiapine 100 to 150 ( 100mg at night and 50mg during the day) looks like script written by rubi was discontinued and the new one was written by the neuro and sent over... please advise

## 2024-03-14 DIAGNOSIS — F02.C18 SEVERE EARLY ONSET ALZHEIMER'S DEMENTIA WITH OTHER BEHAVIORAL DISTURBANCE (HCC): ICD-10-CM

## 2024-03-14 DIAGNOSIS — G30.0 SEVERE EARLY ONSET ALZHEIMER'S DEMENTIA WITH OTHER BEHAVIORAL DISTURBANCE (HCC): ICD-10-CM

## 2024-03-14 RX ORDER — QUETIAPINE FUMARATE 100 MG/1
100 TABLET, FILM COATED ORAL NIGHTLY
Qty: 90 TABLET | Refills: 0 | OUTPATIENT
Start: 2024-03-14

## 2024-03-28 DIAGNOSIS — G30.0 SEVERE EARLY ONSET ALZHEIMER'S DEMENTIA WITH OTHER BEHAVIORAL DISTURBANCE (HCC): ICD-10-CM

## 2024-03-28 DIAGNOSIS — F02.C18 SEVERE EARLY ONSET ALZHEIMER'S DEMENTIA WITH OTHER BEHAVIORAL DISTURBANCE (HCC): ICD-10-CM

## 2024-03-28 RX ORDER — QUETIAPINE FUMARATE 50 MG/1
TABLET, FILM COATED ORAL
Qty: 360 TABLET | Refills: 1 | Status: SHIPPED | OUTPATIENT
Start: 2024-03-28

## 2024-03-29 ENCOUNTER — TELEPHONE (OUTPATIENT)
Age: 74
End: 2024-03-29

## 2024-03-29 NOTE — TELEPHONE ENCOUNTER
Shelby from [facility] wanted to let Dr. Trivedi know that she was admitted on 3/7/2024. Her phone number for any questions is [phone].

## 2024-04-10 DIAGNOSIS — G30.0 SEVERE EARLY ONSET ALZHEIMER'S DEMENTIA WITH OTHER BEHAVIORAL DISTURBANCE (HCC): ICD-10-CM

## 2024-04-10 DIAGNOSIS — F02.C18 SEVERE EARLY ONSET ALZHEIMER'S DEMENTIA WITH OTHER BEHAVIORAL DISTURBANCE (HCC): ICD-10-CM

## 2024-05-09 RX ORDER — LORATADINE 10 MG/1
TABLET ORAL
Qty: 90 TABLET | Refills: 1 | Status: SHIPPED | OUTPATIENT
Start: 2024-05-09

## 2024-05-10 DIAGNOSIS — F02.C18 SEVERE EARLY ONSET ALZHEIMER'S DEMENTIA WITH OTHER BEHAVIORAL DISTURBANCE (HCC): ICD-10-CM

## 2024-05-10 DIAGNOSIS — G30.0 SEVERE EARLY ONSET ALZHEIMER'S DEMENTIA WITH OTHER BEHAVIORAL DISTURBANCE (HCC): ICD-10-CM

## 2024-05-10 RX ORDER — MEMANTINE HYDROCHLORIDE 10 MG/1
10 TABLET ORAL 2 TIMES DAILY
Qty: 180 TABLET | Refills: 0 | Status: SHIPPED | OUTPATIENT
Start: 2024-05-10

## 2024-06-17 DIAGNOSIS — K21.9 GASTRO-ESOPHAGEAL REFLUX DISEASE WITHOUT ESOPHAGITIS: ICD-10-CM

## 2024-06-17 RX ORDER — MULTIVITAMIN/IRON/FOLIC ACID 18MG-0.4MG
20 TABLET ORAL DAILY
Qty: 84 TABLET | Refills: 3 | Status: SHIPPED | OUTPATIENT
Start: 2024-06-17

## 2024-07-10 DIAGNOSIS — G30.0 SEVERE EARLY ONSET ALZHEIMER'S DEMENTIA WITH OTHER BEHAVIORAL DISTURBANCE (HCC): ICD-10-CM

## 2024-07-10 DIAGNOSIS — F02.C18 SEVERE EARLY ONSET ALZHEIMER'S DEMENTIA WITH OTHER BEHAVIORAL DISTURBANCE (HCC): ICD-10-CM

## 2024-07-10 NOTE — TELEPHONE ENCOUNTER
Refills sent in on requested medication.  Patient is due for appointment before refills run out, please call to get them scheduled.

## 2024-07-12 ENCOUNTER — PATIENT MESSAGE (OUTPATIENT)
Dept: PRIMARY CARE CLINIC | Facility: CLINIC | Age: 74
End: 2024-07-12

## 2024-07-12 NOTE — TELEPHONE ENCOUNTER
Good day, [place] has refilled your prescriptions. She would like for you to schedule an appointment, a virtual appointment is fine.     I have sent a link to schedule   Tasks For Patient     Appointment: [facility] Office Visit

## 2024-08-19 ENCOUNTER — COMMUNITY OUTREACH (OUTPATIENT)
Dept: PRIMARY CARE CLINIC | Facility: CLINIC | Age: 74
End: 2024-08-19

## 2024-08-19 NOTE — PROGRESS NOTES
Patient's HM shows they are overdue for Mammogram.   Codeoscopic and  files searched without success.